# Patient Record
Sex: MALE | Race: WHITE | Employment: OTHER | URBAN - METROPOLITAN AREA
[De-identification: names, ages, dates, MRNs, and addresses within clinical notes are randomized per-mention and may not be internally consistent; named-entity substitution may affect disease eponyms.]

---

## 2024-03-20 ENCOUNTER — HOSPITAL ENCOUNTER (INPATIENT)
Age: 74
LOS: 9 days | Discharge: OTHER INSTITUTION | End: 2024-03-29
Attending: EMERGENCY MEDICINE | Admitting: PSYCHIATRY & NEUROLOGY
Payer: MEDICARE

## 2024-03-20 DIAGNOSIS — F41.1 GAD (GENERALIZED ANXIETY DISORDER): Primary | ICD-10-CM

## 2024-03-20 DIAGNOSIS — F01.C11 SEVERE VASCULAR DEMENTIA WITH AGITATION (HCC): ICD-10-CM

## 2024-03-20 PROBLEM — F03.90 DEMENTIA, UNSPECIFIED DEMENTIA SEVERITY, UNSPECIFIED DEMENTIA TYPE, UNSPECIFIED WHETHER BEHAVIORAL, PSYCHOTIC, OR MOOD DISTURBANCE OR ANXIETY (HCC): Status: ACTIVE | Noted: 2024-03-20

## 2024-03-20 PROBLEM — F03.918 DEMENTIA WITH BEHAVIORAL DISTURBANCE (HCC): Status: ACTIVE | Noted: 2024-03-20

## 2024-03-20 LAB
ALBUMIN SERPL-MCNC: 4.1 G/DL (ref 3.4–5)
ALBUMIN/GLOB SERPL: 1.7 {RATIO} (ref 1.1–2.2)
ALP SERPL-CCNC: 114 U/L (ref 40–129)
ALT SERPL-CCNC: 11 U/L (ref 10–40)
AMPHETAMINES UR QL SCN>1000 NG/ML: NORMAL
ANION GAP SERPL CALCULATED.3IONS-SCNC: 11 MMOL/L (ref 3–16)
AST SERPL-CCNC: 18 U/L (ref 15–37)
BARBITURATES UR QL SCN>200 NG/ML: NORMAL
BASOPHILS # BLD: 0.1 K/UL (ref 0–0.2)
BASOPHILS NFR BLD: 0.9 %
BENZODIAZ UR QL SCN>200 NG/ML: NORMAL
BILIRUB SERPL-MCNC: 0.4 MG/DL (ref 0–1)
BILIRUB UR QL STRIP.AUTO: NEGATIVE
BUN SERPL-MCNC: 16 MG/DL (ref 7–20)
CALCIUM SERPL-MCNC: 9.1 MG/DL (ref 8.3–10.6)
CANNABINOIDS UR QL SCN>50 NG/ML: NORMAL
CHLORIDE SERPL-SCNC: 100 MMOL/L (ref 99–110)
CLARITY UR: CLEAR
CO2 SERPL-SCNC: 26 MMOL/L (ref 21–32)
COCAINE UR QL SCN: NORMAL
COLOR UR: ABNORMAL
CREAT SERPL-MCNC: 1.1 MG/DL (ref 0.8–1.3)
DEPRECATED RDW RBC AUTO: 15.5 % (ref 12.4–15.4)
DRUG SCREEN COMMENT UR-IMP: NORMAL
EOSINOPHIL # BLD: 0.3 K/UL (ref 0–0.6)
EOSINOPHIL NFR BLD: 3.8 %
FENTANYL SCREEN, URINE: NORMAL
FLUAV RNA RESP QL NAA+PROBE: NOT DETECTED
FLUBV RNA RESP QL NAA+PROBE: NOT DETECTED
GFR SERPLBLD CREATININE-BSD FMLA CKD-EPI: >60 ML/MIN/{1.73_M2}
GLUCOSE BLD-MCNC: 146 MG/DL (ref 70–99)
GLUCOSE BLD-MCNC: 218 MG/DL (ref 70–99)
GLUCOSE SERPL-MCNC: 271 MG/DL (ref 70–99)
GLUCOSE UR STRIP.AUTO-MCNC: 500 MG/DL
HCT VFR BLD AUTO: 40.8 % (ref 40.5–52.5)
HGB BLD-MCNC: 13.5 G/DL (ref 13.5–17.5)
HGB UR QL STRIP.AUTO: NEGATIVE
KETONES UR STRIP.AUTO-MCNC: NEGATIVE MG/DL
LEUKOCYTE ESTERASE UR QL STRIP.AUTO: NEGATIVE
LYMPHOCYTES # BLD: 1.5 K/UL (ref 1–5.1)
LYMPHOCYTES NFR BLD: 18.2 %
MCH RBC QN AUTO: 31.7 PG (ref 26–34)
MCHC RBC AUTO-ENTMCNC: 33.1 G/DL (ref 31–36)
MCV RBC AUTO: 95.9 FL (ref 80–100)
METHADONE UR QL SCN>300 NG/ML: NORMAL
MONOCYTES # BLD: 0.7 K/UL (ref 0–1.3)
MONOCYTES NFR BLD: 8.9 %
NEUTROPHILS # BLD: 5.4 K/UL (ref 1.7–7.7)
NEUTROPHILS NFR BLD: 68.2 %
NITRITE UR QL STRIP.AUTO: NEGATIVE
OPIATES UR QL SCN>300 NG/ML: NORMAL
OXYCODONE UR QL SCN: NORMAL
PCP UR QL SCN>25 NG/ML: NORMAL
PERFORMED ON: ABNORMAL
PERFORMED ON: ABNORMAL
PH UR STRIP.AUTO: 6 [PH] (ref 5–8)
PH UR STRIP: 6 [PH]
PLATELET # BLD AUTO: 151 K/UL (ref 135–450)
PMV BLD AUTO: 8.4 FL (ref 5–10.5)
POTASSIUM SERPL-SCNC: 3.7 MMOL/L (ref 3.5–5.1)
PROT SERPL-MCNC: 6.5 G/DL (ref 6.4–8.2)
PROT UR STRIP.AUTO-MCNC: NEGATIVE MG/DL
RBC # BLD AUTO: 4.25 M/UL (ref 4.2–5.9)
SARS-COV-2 RNA RESP QL NAA+PROBE: NOT DETECTED
SODIUM SERPL-SCNC: 137 MMOL/L (ref 136–145)
SP GR UR STRIP.AUTO: 1.01 (ref 1–1.03)
TSH SERPL DL<=0.005 MIU/L-ACNC: 6.62 UIU/ML (ref 0.27–4.2)
UA COMPLETE W REFLEX CULTURE PNL UR: ABNORMAL
UA DIPSTICK W REFLEX MICRO PNL UR: ABNORMAL
URN SPEC COLLECT METH UR: ABNORMAL
UROBILINOGEN UR STRIP-ACNC: 0.2 E.U./DL
WBC # BLD AUTO: 8 K/UL (ref 4–11)

## 2024-03-20 PROCEDURE — 1240000000 HC EMOTIONAL WELLNESS R&B

## 2024-03-20 PROCEDURE — 84439 ASSAY OF FREE THYROXINE: CPT

## 2024-03-20 PROCEDURE — 80307 DRUG TEST PRSMV CHEM ANLYZR: CPT

## 2024-03-20 PROCEDURE — 81003 URINALYSIS AUTO W/O SCOPE: CPT

## 2024-03-20 PROCEDURE — 83036 HEMOGLOBIN GLYCOSYLATED A1C: CPT

## 2024-03-20 PROCEDURE — 6370000000 HC RX 637 (ALT 250 FOR IP): Performed by: PSYCHIATRY & NEUROLOGY

## 2024-03-20 PROCEDURE — 99285 EMERGENCY DEPT VISIT HI MDM: CPT

## 2024-03-20 PROCEDURE — 84443 ASSAY THYROID STIM HORMONE: CPT

## 2024-03-20 PROCEDURE — 36415 COLL VENOUS BLD VENIPUNCTURE: CPT

## 2024-03-20 PROCEDURE — 85025 COMPLETE CBC W/AUTO DIFF WBC: CPT

## 2024-03-20 PROCEDURE — 80053 COMPREHEN METABOLIC PANEL: CPT

## 2024-03-20 PROCEDURE — 80061 LIPID PANEL: CPT

## 2024-03-20 PROCEDURE — 87636 SARSCOV2 & INF A&B AMP PRB: CPT

## 2024-03-20 RX ORDER — QUETIAPINE FUMARATE 25 MG/1
25 TABLET, FILM COATED ORAL NIGHTLY
Status: DISCONTINUED | OUTPATIENT
Start: 2024-03-20 | End: 2024-03-26

## 2024-03-20 RX ORDER — ACETAMINOPHEN 325 MG/1
650 TABLET ORAL EVERY 4 HOURS PRN
Status: DISCONTINUED | OUTPATIENT
Start: 2024-03-20 | End: 2024-03-29 | Stop reason: HOSPADM

## 2024-03-20 RX ORDER — GLUCAGON 1 MG/ML
1 KIT INJECTION PRN
Status: DISCONTINUED | OUTPATIENT
Start: 2024-03-20 | End: 2024-03-22

## 2024-03-20 RX ORDER — QUETIAPINE FUMARATE 25 MG/1
25 TABLET, FILM COATED ORAL NIGHTLY
Status: ON HOLD | COMMUNITY
End: 2024-03-28

## 2024-03-20 RX ORDER — DEXTROSE MONOHYDRATE 100 MG/ML
INJECTION, SOLUTION INTRAVENOUS CONTINUOUS PRN
Status: DISCONTINUED | OUTPATIENT
Start: 2024-03-20 | End: 2024-03-29 | Stop reason: HOSPADM

## 2024-03-20 RX ORDER — LACTOBACILLUS RHAMNOSUS GG 10B CELL
1 CAPSULE ORAL 2 TIMES DAILY
Status: DISCONTINUED | OUTPATIENT
Start: 2024-03-21 | End: 2024-03-29 | Stop reason: HOSPADM

## 2024-03-20 RX ORDER — IBUPROFEN 400 MG/1
400 TABLET ORAL EVERY 6 HOURS PRN
Status: DISCONTINUED | OUTPATIENT
Start: 2024-03-20 | End: 2024-03-29 | Stop reason: HOSPADM

## 2024-03-20 RX ORDER — ALPRAZOLAM 0.25 MG/1
0.25 TABLET ORAL 3 TIMES DAILY PRN
Status: DISCONTINUED | OUTPATIENT
Start: 2024-03-20 | End: 2024-03-29 | Stop reason: HOSPADM

## 2024-03-20 RX ORDER — MECOBALAMIN 5000 MCG
5 TABLET,DISINTEGRATING ORAL NIGHTLY
Status: DISCONTINUED | OUTPATIENT
Start: 2024-03-20 | End: 2024-03-29 | Stop reason: HOSPADM

## 2024-03-20 RX ORDER — FAMOTIDINE 20 MG/1
20 TABLET, FILM COATED ORAL DAILY
Status: ON HOLD | COMMUNITY
End: 2024-03-28

## 2024-03-20 RX ORDER — INSULIN LISPRO 100 [IU]/ML
5 INJECTION, SOLUTION INTRAVENOUS; SUBCUTANEOUS
Status: DISCONTINUED | OUTPATIENT
Start: 2024-03-20 | End: 2024-03-27

## 2024-03-20 RX ORDER — FAMOTIDINE 20 MG/1
20 TABLET, FILM COATED ORAL DAILY
Status: DISCONTINUED | OUTPATIENT
Start: 2024-03-20 | End: 2024-03-29 | Stop reason: HOSPADM

## 2024-03-20 RX ORDER — NICOTINE 21 MG/24HR
1 PATCH, TRANSDERMAL 24 HOURS TRANSDERMAL DAILY
Status: DISCONTINUED | OUTPATIENT
Start: 2024-03-20 | End: 2024-03-29 | Stop reason: HOSPADM

## 2024-03-20 RX ORDER — ALPRAZOLAM 0.25 MG/1
0.25 TABLET ORAL 3 TIMES DAILY
Status: ON HOLD | COMMUNITY
End: 2024-03-28

## 2024-03-20 RX ORDER — MAGNESIUM HYDROXIDE/ALUMINUM HYDROXICE/SIMETHICONE 120; 1200; 1200 MG/30ML; MG/30ML; MG/30ML
30 SUSPENSION ORAL EVERY 6 HOURS PRN
Status: DISCONTINUED | OUTPATIENT
Start: 2024-03-20 | End: 2024-03-29 | Stop reason: HOSPADM

## 2024-03-20 RX ORDER — ACETAMINOPHEN 325 MG/1
650 TABLET ORAL EVERY 6 HOURS PRN
Status: ON HOLD | COMMUNITY
End: 2024-03-28

## 2024-03-20 RX ORDER — OLANZAPINE 5 MG/1
5 TABLET ORAL EVERY 4 HOURS PRN
Status: DISCONTINUED | OUTPATIENT
Start: 2024-03-20 | End: 2024-03-29 | Stop reason: HOSPADM

## 2024-03-20 RX ORDER — POLYETHYLENE GLYCOL 3350 17 G
2 POWDER IN PACKET (EA) ORAL
Status: DISCONTINUED | OUTPATIENT
Start: 2024-03-20 | End: 2024-03-29 | Stop reason: HOSPADM

## 2024-03-20 RX ORDER — BENZTROPINE MESYLATE 1 MG/ML
2 INJECTION INTRAMUSCULAR; INTRAVENOUS 2 TIMES DAILY PRN
Status: DISCONTINUED | OUTPATIENT
Start: 2024-03-20 | End: 2024-03-29 | Stop reason: HOSPADM

## 2024-03-20 RX ORDER — HYDROXYZINE HYDROCHLORIDE 25 MG/1
25 TABLET, FILM COATED ORAL EVERY 6 HOURS PRN
Status: DISCONTINUED | OUTPATIENT
Start: 2024-03-20 | End: 2024-03-29 | Stop reason: HOSPADM

## 2024-03-20 RX ADMIN — IBUPROFEN 400 MG: 400 TABLET, FILM COATED ORAL at 18:26

## 2024-03-20 RX ADMIN — FAMOTIDINE 20 MG: 20 TABLET ORAL at 20:42

## 2024-03-20 RX ADMIN — METFORMIN HYDROCHLORIDE 500 MG: 500 TABLET ORAL at 20:42

## 2024-03-20 RX ADMIN — QUETIAPINE FUMARATE 25 MG: 25 TABLET ORAL at 20:41

## 2024-03-20 RX ADMIN — Medication 5 MG: at 20:42

## 2024-03-20 ASSESSMENT — PAIN DESCRIPTION - ORIENTATION: ORIENTATION: RIGHT

## 2024-03-20 ASSESSMENT — LIFESTYLE VARIABLES
HOW OFTEN DO YOU HAVE A DRINK CONTAINING ALCOHOL: NEVER
HOW MANY STANDARD DRINKS CONTAINING ALCOHOL DO YOU HAVE ON A TYPICAL DAY: PATIENT DOES NOT DRINK

## 2024-03-20 ASSESSMENT — SLEEP AND FATIGUE QUESTIONNAIRES
DO YOU HAVE DIFFICULTY SLEEPING: COMMENT
DO YOU USE A SLEEP AID: COMMENT

## 2024-03-20 ASSESSMENT — PAIN DESCRIPTION - DESCRIPTORS: DESCRIPTORS: ACHING

## 2024-03-20 ASSESSMENT — PAIN DESCRIPTION - LOCATION: LOCATION: SHOULDER

## 2024-03-20 ASSESSMENT — PAIN SCALES - GENERAL: PAINLEVEL_OUTOF10: 0

## 2024-03-20 ASSESSMENT — PAIN - FUNCTIONAL ASSESSMENT: PAIN_FUNCTIONAL_ASSESSMENT: NONE - DENIES PAIN
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
## 2024-03-20 NOTE — PLAN OF CARE
. [facility]  Admission Note     Admission Type: Involuntary       Reason for admission: Major Neurocognitive disorder with behaviors. Patient was brought to the ED via ambulance from nursing facility where it was reported he was physically aggressive and combative. He was put on a statement of belief in the ED.         Addictive Behavior: unknown       Medical Problems:   No past medical history on file.    Status EXAM:       Tobacco Screening:  Practical Counseling, on admission, karen X, if applicable and completed (first 3 are required if patient doesn't refuse) n/a:            ( ) Recognizing danger situations (included triggers and roadblocks)                    ( ) Coping skills (new ways to manage stress,relaxation techniques, changing routine, distraction)                                                           ( ) Basic information about quitting (benefits of quitting, techniques in how to quit, available resources  ( ) [facility]                                                                                                                   ( ) Patient refused counseling  ( x) Patient has not smoked in the last 30 days    Metabolic Screening:    No results found for: \"LABA1C\"    No results found for: \"CHOL\"  No results found for: \"TRIG\"  No results found for: \"HDL\"  No components found for: \"LDLCAL\"  No components found for: \"LABVLDL\"      There is no height or weight on file to calculate BMI.    BP Readings from Last 2 Encounters:   03/20/24 132/74       Recliner Assessment:  Patient is not able to demonstrate the ability to move from a reclining position to an upright position within the recliner due to cognitive disability.    Pt admitted with followings belongings:     The following personal items were collected during admission. Items secured in locker/safe. Items will be returned to patient at discharge.     Tony Amado RN

## 2024-03-20 NOTE — ED NOTES
Tramaine from [facility] came to ER and gave papers to [facility], Tramaine stated pt is evicted from [facility]

## 2024-03-20 NOTE — ED NOTES
Pt remains resting with unlabored, even respirations.   Pt denies any needs, does not endorse hunger or thirst at this time.   Pt remains on bed alarm with call light within reach.

## 2024-03-20 NOTE — BH NOTE
4 Eyes Skin Assessment     The patient is being assessed for  Admission    I agree that 2 RN's have performed a thorough Head to Toe Skin Assessment on the patient. ALL assessment sites listed below have been assessed.       Areas assessed for pressure by both nurses: pimple like areas to chest and back. L wrists with scabs. Healing scabs to left wrist  [x]   Head, Face, and Ears   [x]   Shoulders, Back, and Chest  [x]   Arms, Elbows, and Hands   [x]   Coccyx, Sacrum, and Ischum  [x]   Legs, Feet, and Heels                                Skin Assessed Under all Medical Devices by both nurses:  N/a               All Mepilex Borders were peeled back and area peeked at by both nurses:  No: n/a  Please list where Mepilex Borders are located:  n/a                 Does the Patient have Skin Breakdown related to pressure?  No     (Insert Photo heren/a)         Dino Prevention initiated:  No   Wound Care Orders initiated:  No      WOC nurse consulted for Pressure Injury (Stage 3,4, Unstageable, DTI, NWPT, Complex wounds)and New or Established Ostomies:  No        Nurse 1 eSignature: Electronically signed by Aida Omer RN on 3/20/24 at 6:34 PM EDT    **SHARE this note so that the co-signing nurse is able to place an eSignature**    Nurse 2 eSignature: Electronically signed by Tony Amado RN on 3/20/24 at 6:45 PM EDT
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
## 2024-03-20 NOTE — PLAN OF CARE
Home Medication Reconciliation Status          [x] COMPLETE       Medication history has been reviewed and obtained from the following source(s):       [] patient/family verbal report             [] patient/family provided written list       [] external pharmacy   [x] external facility list         [x]  Provider notified that home medication reconciliation is complete          [] IN PROGRESS       Medication reconciliation marked in progress at this time due to:       [] patient/family poor historian      [] waiting arrival of family to clarify       [] waiting for accurate list        [] external pharmacy needs called      * Follow up is needed.          [] UNABLE TO ASSESS       Medication reconciliation is incomplete and unable to assess at this time due to:       [] critical patient condition   [] patient is unresponsive        [] no family available                       [] unknown pharmacy       [] anonymous patient          * Follow up is needed.      [] Pharmacy consult placed for medication reconciliation assistance   Additional comments:
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
## 2024-03-21 PROBLEM — Z79.4 TYPE 2 DIABETES MELLITUS WITHOUT COMPLICATION, WITH LONG-TERM CURRENT USE OF INSULIN (HCC): Status: ACTIVE | Noted: 2024-03-21

## 2024-03-21 PROBLEM — E11.9 TYPE 2 DIABETES MELLITUS WITHOUT COMPLICATION, WITH LONG-TERM CURRENT USE OF INSULIN (HCC): Status: ACTIVE | Noted: 2024-03-21

## 2024-03-21 PROBLEM — I38 ENDOCARDITIS: Status: ACTIVE | Noted: 2024-03-21

## 2024-03-21 PROBLEM — I34.0 MITRAL VALVE INSUFFICIENCY: Status: ACTIVE | Noted: 2024-03-21

## 2024-03-21 PROBLEM — F01.C11 SEVERE VASCULAR DEMENTIA WITH AGITATION (HCC): Status: ACTIVE | Noted: 2024-03-21

## 2024-03-21 LAB
EKG ATRIAL RATE: 62 BPM
EKG DIAGNOSIS: NORMAL
EKG P AXIS: 68 DEGREES
EKG P-R INTERVAL: 162 MS
EKG Q-T INTERVAL: 414 MS
EKG QRS DURATION: 70 MS
EKG QTC CALCULATION (BAZETT): 420 MS
EKG R AXIS: 7 DEGREES
EKG T AXIS: 23 DEGREES
EKG VENTRICULAR RATE: 62 BPM
GLUCOSE BLD-MCNC: 181 MG/DL (ref 70–99)
GLUCOSE BLD-MCNC: 199 MG/DL (ref 70–99)
PERFORMED ON: ABNORMAL
PERFORMED ON: ABNORMAL

## 2024-03-21 PROCEDURE — 99222 1ST HOSP IP/OBS MODERATE 55: CPT

## 2024-03-21 PROCEDURE — 6370000000 HC RX 637 (ALT 250 FOR IP): Performed by: PSYCHIATRY & NEUROLOGY

## 2024-03-21 PROCEDURE — 93010 ELECTROCARDIOGRAM REPORT: CPT | Performed by: INTERNAL MEDICINE

## 2024-03-21 PROCEDURE — 93005 ELECTROCARDIOGRAM TRACING: CPT | Performed by: PSYCHIATRY & NEUROLOGY

## 2024-03-21 PROCEDURE — 99223 1ST HOSP IP/OBS HIGH 75: CPT | Performed by: PSYCHIATRY & NEUROLOGY

## 2024-03-21 PROCEDURE — 1240000000 HC EMOTIONAL WELLNESS R&B

## 2024-03-21 RX ADMIN — METFORMIN HYDROCHLORIDE 500 MG: 500 TABLET ORAL at 17:25

## 2024-03-21 RX ADMIN — FAMOTIDINE 20 MG: 20 TABLET ORAL at 08:02

## 2024-03-21 RX ADMIN — ALPRAZOLAM 0.25 MG: 0.25 TABLET ORAL at 08:57

## 2024-03-21 RX ADMIN — Medication 5 MG: at 20:04

## 2024-03-21 RX ADMIN — IBUPROFEN 400 MG: 400 TABLET, FILM COATED ORAL at 18:21

## 2024-03-21 RX ADMIN — INSULIN LISPRO 5 UNITS: 100 INJECTION, SOLUTION INTRAVENOUS; SUBCUTANEOUS at 17:11

## 2024-03-21 RX ADMIN — ALPRAZOLAM 0.25 MG: 0.25 TABLET ORAL at 20:04

## 2024-03-21 RX ADMIN — QUETIAPINE FUMARATE 25 MG: 25 TABLET ORAL at 20:04

## 2024-03-21 RX ADMIN — METFORMIN HYDROCHLORIDE 500 MG: 500 TABLET ORAL at 08:02

## 2024-03-21 RX ADMIN — INSULIN LISPRO 5 UNITS: 100 INJECTION, SOLUTION INTRAVENOUS; SUBCUTANEOUS at 08:47

## 2024-03-21 RX ADMIN — Medication 1 CAPSULE: at 08:02

## 2024-03-21 RX ADMIN — IBUPROFEN 400 MG: 400 TABLET, FILM COATED ORAL at 13:00

## 2024-03-21 ASSESSMENT — PAIN DESCRIPTION - ORIENTATION
ORIENTATION: RIGHT
ORIENTATION: RIGHT

## 2024-03-21 ASSESSMENT — SLEEP AND FATIGUE QUESTIONNAIRES
DO YOU HAVE DIFFICULTY SLEEPING: YES
SLEEP PATTERN: UNABLE TO ASSESS

## 2024-03-21 ASSESSMENT — PAIN DESCRIPTION - DESCRIPTORS
DESCRIPTORS: ACHING
DESCRIPTORS: ACHING

## 2024-03-21 ASSESSMENT — PAIN - FUNCTIONAL ASSESSMENT
PAIN_FUNCTIONAL_ASSESSMENT: ACTIVITIES ARE NOT PREVENTED
PAIN_FUNCTIONAL_ASSESSMENT: ACTIVITIES ARE NOT PREVENTED

## 2024-03-21 ASSESSMENT — PAIN DESCRIPTION - LOCATION
LOCATION: SHOULDER
LOCATION: SHOULDER

## 2024-03-21 ASSESSMENT — PAIN SCALES - GENERAL: PAINLEVEL_OUTOF10: 6

## 2024-03-21 NOTE — PROGRESS NOTES
Pt A+O to self only, isolative to his room, cooperative and medication compliant.  He denies SI/HI/AVH and no RTIS noted.  He is currently resting in bed and denies any needs at this time.

## 2024-03-21 NOTE — PLAN OF CARE
Pt. Is quiet and withdrawn to his bedroom. He is confused, alert to self only, friendly upon approach, good appetite, and received medications well tolerated. He c/o right shoulder pain, offered pain intervention including medication patient declined assistance. Pt has been cooperative no aggressive behaviors noted. No SI HI AVH noted.

## 2024-03-21 NOTE — PLAN OF CARE
Call placed to [facility] [phone] to inquire about infusion therapy for [place]. No answer after several attempts.
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
## 2024-03-21 NOTE — PLAN OF CARE
Pt has had no exit seeking behavior or elopement attempts.  He remains withdrawn to his room.    Problem: Risk for Elopement  Goal: Patient will not exit the unit/facility without proper excort  Outcome: Progressing  Flowsheets  Taken 3/20/2024 1839 by Aida Omer, RN  Nursing Interventions for Elopement Risk: Assist with personal care needs such as toileting, eating, dressing, as needed to reduce the risk of wandering  Taken 3/20/2024 1807 by Aida Omer, RN  Nursing Interventions for Elopement Risk: Assist with personal care needs such as toileting, eating, dressing, as needed to reduce the risk of wandering
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
## 2024-03-21 NOTE — PLAN OF CARE
Advised patient to avoid certain medications such as antihistamine/decongestant drops, cold medications and to read labels on over the counter and prescription medications in which it may cause an acute angle-closure glaucoma attack until procedures are done in both eyes. Pt. C/o right shoulder pain administered ibuprofen 400 mg po at 1822

## 2024-03-21 NOTE — PLAN OF CARE
[facility]  Initial Interdisciplinary Treatment Plan NOTE    Review Date & Time: 3/21/24 0915    Patient was not in treatment team    Admission Type:   Admission Type: Involuntary    Reason for admission:  Reason for Admission: Patient is confused and became combative with staff      Estimated Length of Stay Update:  3-5 days  Estimated Discharge Date Update: 3/24/24-3/26/24    EDUCATION:   Learner Progress Toward Treatment Goals: Reviewed results and recommendations of this team    Method: Individual    Outcome: Verbalized understanding    PATIENT GOALS: none expressed    PLAN/TREATMENT RECOMMENDATIONS UPDATE: evaluate and treat    GOALS UPDATE:   Time frame for Short-Term Goals: 2 days    Tony Amado RN

## 2024-03-21 NOTE — PLAN OF CARE
Pt is anxious, pacing in his room, states he's jittery and nervious. Administered Xanax 0.25 mg at 0858

## 2024-03-21 NOTE — PLAN OF CARE
Pt c/o right shoulder pain. Administered Ibuprofen 400 mg PO at 1300. Will monitor for effectiveness.
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
## 2024-03-21 NOTE — PROGRESS NOTES
Behavioral Services  Medicare Certification Upon Admission    I certify that this patient's inpatient psychiatric hospital admission is medically necessary for:    [x] (1) Treatment which could reasonably be expected to improve this patient's condition,       [x] (2) Or for diagnostic study;     AND     [x](2) The inpatient psychiatric services are provided while the individual is under the care of a physician and are included in the individualized plan of care.    Estimated length of stay/service 7 d    Plan for post-hospital care outpt    Electronically signed by GABINO TREVIÑO MD on 3/21/2024 at 11:49 AM

## 2024-03-22 ENCOUNTER — APPOINTMENT (OUTPATIENT)
Dept: GENERAL RADIOLOGY | Age: 74
End: 2024-03-22
Payer: MEDICARE

## 2024-03-22 LAB
CHOLEST SERPL-MCNC: 228 MG/DL (ref 0–199)
EST. AVERAGE GLUCOSE BLD GHB EST-MCNC: 191.5 MG/DL
GLUCOSE BLD-MCNC: 135 MG/DL (ref 70–99)
GLUCOSE BLD-MCNC: 141 MG/DL (ref 70–99)
GLUCOSE BLD-MCNC: 161 MG/DL (ref 70–99)
GLUCOSE BLD-MCNC: 191 MG/DL (ref 70–99)
HBA1C MFR BLD: 8.3 %
HDLC SERPL-MCNC: 59 MG/DL (ref 40–60)
LDLC SERPL CALC-MCNC: 132 MG/DL
PERFORMED ON: ABNORMAL
T4 FREE SERPL-MCNC: 1.1 NG/DL (ref 0.9–1.8)
TRIGL SERPL-MCNC: 184 MG/DL (ref 0–150)
VLDLC SERPL CALC-MCNC: 37 MG/DL

## 2024-03-22 PROCEDURE — 1240000000 HC EMOTIONAL WELLNESS R&B

## 2024-03-22 PROCEDURE — 73030 X-RAY EXAM OF SHOULDER: CPT

## 2024-03-22 PROCEDURE — 6370000000 HC RX 637 (ALT 250 FOR IP): Performed by: PSYCHIATRY & NEUROLOGY

## 2024-03-22 PROCEDURE — 99233 SBSQ HOSP IP/OBS HIGH 50: CPT | Performed by: PSYCHIATRY & NEUROLOGY

## 2024-03-22 PROCEDURE — 73060 X-RAY EXAM OF HUMERUS: CPT

## 2024-03-22 RX ORDER — MEMANTINE HYDROCHLORIDE 5 MG/1
5 TABLET ORAL DAILY
Status: DISCONTINUED | OUTPATIENT
Start: 2024-03-22 | End: 2024-03-25

## 2024-03-22 RX ORDER — PERPHENAZINE 4 MG/1
4 TABLET ORAL NIGHTLY
Status: DISCONTINUED | OUTPATIENT
Start: 2024-03-22 | End: 2024-03-29 | Stop reason: HOSPADM

## 2024-03-22 RX ADMIN — PERPHENAZINE 4 MG: 4 TABLET, FILM COATED ORAL at 20:50

## 2024-03-22 RX ADMIN — Medication 1 CAPSULE: at 10:05

## 2024-03-22 RX ADMIN — QUETIAPINE FUMARATE 25 MG: 25 TABLET ORAL at 20:50

## 2024-03-22 RX ADMIN — FAMOTIDINE 20 MG: 20 TABLET ORAL at 10:05

## 2024-03-22 RX ADMIN — MEMANTINE HYDROCHLORIDE 5 MG: 5 TABLET ORAL at 14:23

## 2024-03-22 RX ADMIN — Medication 5 MG: at 20:50

## 2024-03-22 RX ADMIN — METFORMIN HYDROCHLORIDE 500 MG: 500 TABLET ORAL at 16:56

## 2024-03-22 RX ADMIN — INSULIN LISPRO 5 UNITS: 100 INJECTION, SOLUTION INTRAVENOUS; SUBCUTANEOUS at 11:50

## 2024-03-22 RX ADMIN — Medication 1 CAPSULE: at 13:58

## 2024-03-22 RX ADMIN — IBUPROFEN 400 MG: 400 TABLET, FILM COATED ORAL at 14:05

## 2024-03-22 RX ADMIN — ACETAMINOPHEN 650 MG: 325 TABLET ORAL at 14:05

## 2024-03-22 RX ADMIN — METFORMIN HYDROCHLORIDE 500 MG: 500 TABLET ORAL at 10:05

## 2024-03-22 ASSESSMENT — PAIN SCALES - GENERAL
PAINLEVEL_OUTOF10: 0
PAINLEVEL_OUTOF10: 8

## 2024-03-22 ASSESSMENT — PAIN DESCRIPTION - LOCATION: LOCATION: SHOULDER

## 2024-03-22 NOTE — PLAN OF CARE
Patient is alert and oriented x 3. Takes medications whole with water and without issue. Patient is independent and continent. Calm, friendly and cooperative with staff. Is visible on unit after meals.  Interacting with peers appropriately. Denies SI/HI. +RTIS noted.  No signs or symptoms of distress.       Problem: Risk for Elopement  Goal: Patient will not exit the unit/facility without proper excort  3/22/2024 1041 by Mikala Araujo RN  Outcome: Progressing  3/21/2024 2131 by Terrie Sanchez RN  Outcome: Progressing     Problem: Depression  Goal: Will be euthymic at discharge  Description: INTERVENTIONS:  1. Administer medication as ordered  2. Provide emotional support via 1:1 interaction with staff  3. Encourage involvement in milieu/groups/activities  4. Monitor for social isolation  3/22/2024 1041 by Mikala Araujo RN  Outcome: Progressing  3/21/2024 2131 by Terrie Sanchez RN  Outcome: Progressing     Problem: Behavior  Goal: Pt/Family maintain appropriate behavior and adhere to behavioral management agreement, if implemented  Description: INTERVENTIONS:  1. Assess patient/family's coping skills and  non-compliant behavior (including use of illegal substances)  2. Notify security of behavior or suspected illegal substances which indicate the need for search of the family and/or belongings  3. Encourage verbalization of thoughts and concerns in a socially appropriate manner  4. Utilize positive, consistent limit setting strategies supporting safety of patient, staff and others  5. Encourage participation in the decision making process about the behavioral management agreement  6. If a visitor's behavior poses a threat to safety call refer to organization policy.  7. Initiate consult with , Psychosocial CNS, Spiritual Care as appropriate  3/22/2024 1041 by Mikala Araujo RN  Outcome: Progressing  3/21/2024 2131 by Terrie Sanchez RN  Outcome: Not Progressing     Problem:  Progressing  3/21/2024 2131 by Terrie Sanchez, RN  Outcome: Not Progressing

## 2024-03-22 NOTE — PROGRESS NOTES
Department of Psychiatry  AttendingProgress Note  Chief Complaint: dementia  Adonis has  been complaining of pain in R upper arm. He is not able to give much detail regarding the pain and it is a constant complaint. Will order Xrays today .   He is disoriented and unable to provide any information regarding his hospitla stay.   Add Namenda 5 mg QD for dementia.  Add Perphenazine 4 mg hS for agitation .   Patient's chart was reviewed and collaborated with  about the treatment plan.  SUBJECTIVE:    Patient is feeling unchanged. Suicidal ideation:  TRAVON.  Patient does not have medication side effects.    ROS: Patient has new complaints: yes - pain  Sleeping adequately:     Appetite adequate:   Attending groups: No:   Visitors:No    OBJECTIVE    Physical  VITALS:  /89   Pulse 68   Temp 98.6 °F (37 °C) (Temporal)   Resp 16   Ht 1.676 m (5' 5.98\")   Wt 65.8 kg (145 lb)   SpO2 95%   BMI 23.41 kg/m²     Mental Status Examination:  Patients appearance was ill-appearing. Thoughts are Unusual fears. Homicidal ideations TRAVON.  No abnormal movements, tics or mannerisms.  Memory impaired Aims 0. Concentration Poor.   Alert and oriented X 4. Insight and Judgement impaired insight. Patient was uncooperative. Patient gait abnormal. Mood irritable and labile, affect labile affect Hallucinations TRAVON, suicidal ideations no specific plan to harm self Speech loud  Data  Labs:   Admission on 03/20/2024   Component Date Value Ref Range Status    WBC 03/20/2024 8.0  4.0 - 11.0 K/uL Final    RBC 03/20/2024 4.25  4.20 - 5.90 M/uL Final    Hemoglobin 03/20/2024 13.5  13.5 - 17.5 g/dL Final    Hematocrit 03/20/2024 40.8  40.5 - 52.5 % Final    MCV 03/20/2024 95.9  80.0 - 100.0 fL Final    MCH 03/20/2024 31.7  26.0 - 34.0 pg Final    MCHC 03/20/2024 33.1  31.0 - 36.0 g/dL Final    RDW 03/20/2024 15.5 (H)  12.4 - 15.4 % Final    Platelets 03/20/2024 151  135 - 450 K/uL Final    MPV 03/20/2024 8.4  5.0 - 10.5 fL Final     18  15 - 37 U/L Final    Color, UA 03/20/2024 Straw  Straw/Yellow Final    Clarity, UA 03/20/2024 Clear  Clear Final    Glucose, Ur 03/20/2024 500 (A)  Negative mg/dL Final    Bilirubin Urine 03/20/2024 Negative  Negative Final    Ketones, Urine 03/20/2024 Negative  Negative mg/dL Final    Specific Gravity, UA 03/20/2024 1.015  1.005 - 1.030 Final    Blood, Urine 03/20/2024 Negative  Negative Final    pH, UA 03/20/2024 6.0  5.0 - 8.0 Final    Protein, UA 03/20/2024 Negative  Negative mg/dL Final    Urobilinogen, Urine 03/20/2024 0.2  <2.0 E.U./dL Final    Nitrite, Urine 03/20/2024 Negative  Negative Final    Leukocyte Esterase, Urine 03/20/2024 Negative  Negative Final    Microscopic Examination 03/20/2024 Not Indicated   Final    Urine Type 03/20/2024 NotGiven   Final    Urine received in a container without preservatives.    Urine Reflex to Culture 03/20/2024 Not Indicated   Final    Amphetamine Screen, Urine 03/20/2024 Neg  Negative <1000ng/mL Final    Barbiturate Screen, Ur 03/20/2024 Neg  Negative <200 ng/mL Final    Benzodiazepine Screen, Urine 03/20/2024 Neg  Negative <200 ng/mL Final    Cannabinoid Scrn, Ur 03/20/2024 Neg  Negative <50 ng/mL Final    Cocaine Metabolite Screen, Urine 03/20/2024 Neg  Negative <300 ng/mL Final    Opiate Scrn, Ur 03/20/2024 Neg  Negative <300 ng/mL Final    Comment: \"Therapeutic levels of pain medication, especially oxycontin and synthetic  opioids, may not be detected by this Methodology. Pain management screen  panel  Drug panel-PM-Hi Res Ur, Interp (PAIN) should be considered for drug  monitoring \".      PCP Screen, Urine 03/20/2024 Neg  Negative <25 ng/mL Final    Methadone Screen, Urine 03/20/2024 Neg  Negative <300 ng/mL Final    Oxycodone Urine 03/20/2024 Neg  Negative <100 ng/ml Final    FENTANYL SCREEN, URINE 03/20/2024 Neg  Negative <5 ng/mL Final    pH, UA 03/20/2024 6.0   Final    Comment: Urine pH less than 5.0 or greater than 8.0 may indicate sample
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
## 2024-03-23 LAB
GLUCOSE BLD-MCNC: 120 MG/DL (ref 70–99)
GLUCOSE BLD-MCNC: 135 MG/DL (ref 70–99)
GLUCOSE BLD-MCNC: 136 MG/DL (ref 70–99)
GLUCOSE BLD-MCNC: 215 MG/DL (ref 70–99)
PERFORMED ON: ABNORMAL

## 2024-03-23 PROCEDURE — 6370000000 HC RX 637 (ALT 250 FOR IP): Performed by: PSYCHIATRY & NEUROLOGY

## 2024-03-23 PROCEDURE — 1240000000 HC EMOTIONAL WELLNESS R&B

## 2024-03-23 PROCEDURE — 99232 SBSQ HOSP IP/OBS MODERATE 35: CPT | Performed by: NURSE PRACTITIONER

## 2024-03-23 RX ADMIN — MEMANTINE HYDROCHLORIDE 5 MG: 5 TABLET ORAL at 09:58

## 2024-03-23 RX ADMIN — ACETAMINOPHEN 650 MG: 325 TABLET ORAL at 09:58

## 2024-03-23 RX ADMIN — Medication 5 MG: at 20:15

## 2024-03-23 RX ADMIN — PERPHENAZINE 4 MG: 4 TABLET, FILM COATED ORAL at 20:15

## 2024-03-23 RX ADMIN — ALPRAZOLAM 0.25 MG: 0.25 TABLET ORAL at 20:15

## 2024-03-23 RX ADMIN — QUETIAPINE FUMARATE 25 MG: 25 TABLET ORAL at 20:15

## 2024-03-23 RX ADMIN — Medication 1 CAPSULE: at 15:39

## 2024-03-23 RX ADMIN — ACETAMINOPHEN 650 MG: 325 TABLET ORAL at 20:15

## 2024-03-23 RX ADMIN — FAMOTIDINE 20 MG: 20 TABLET ORAL at 09:58

## 2024-03-23 RX ADMIN — Medication 1 CAPSULE: at 09:58

## 2024-03-23 RX ADMIN — INSULIN LISPRO 5 UNITS: 100 INJECTION, SOLUTION INTRAVENOUS; SUBCUTANEOUS at 18:12

## 2024-03-23 RX ADMIN — METFORMIN HYDROCHLORIDE 500 MG: 500 TABLET ORAL at 18:03

## 2024-03-23 RX ADMIN — METFORMIN HYDROCHLORIDE 500 MG: 500 TABLET ORAL at 09:58

## 2024-03-23 ASSESSMENT — PAIN DESCRIPTION - DESCRIPTORS: DESCRIPTORS: PATIENT UNABLE TO DESCRIBE

## 2024-03-23 ASSESSMENT — PAIN DESCRIPTION - LOCATION
LOCATION: SHOULDER

## 2024-03-23 ASSESSMENT — PAIN - FUNCTIONAL ASSESSMENT: PAIN_FUNCTIONAL_ASSESSMENT: ACTIVITIES ARE NOT PREVENTED

## 2024-03-23 ASSESSMENT — PAIN SCALES - GENERAL
PAINLEVEL_OUTOF10: 3
PAINLEVEL_OUTOF10: 0
PAINLEVEL_OUTOF10: 5
PAINLEVEL_OUTOF10: 5

## 2024-03-23 ASSESSMENT — PAIN SCALES - WONG BAKER: WONGBAKER_NUMERICALRESPONSE: NO HURT

## 2024-03-23 ASSESSMENT — PAIN DESCRIPTION - ORIENTATION: ORIENTATION: RIGHT

## 2024-03-23 NOTE — PROGRESS NOTES
Department of Psychiatry  AttendingProgress Note  Chief Complaint: dementia    Patient's chart was reviewed and collaborated with  about the treatment plan.    SUBJECTIVE:        Pt is resting in bed and does not respond to questions. Staff reports patient has been confused. He has complained of arm pain. An xray was negative. He sleeps a lot during the day and isolates in room.     Patient is feeling  unable to assess . Suicidal ideation:  unable to assess, none reported by staff.  Patient  does not report  medication side effects.    ROS: Patient has new complaints: no  Sleeping adequately:  Yes   Appetite adequate: Yes  Attending groups: No:   Visitors:No    OBJECTIVE    Physical  VITALS:  /89   Pulse 68   Temp 98.6 °F (37 °C) (Temporal)   Resp 16   Ht 1.676 m (5' 5.98\")   Wt 65.8 kg (145 lb)   SpO2 95%   BMI 23.41 kg/m²     Mental Status Examination:  Patients appearance was lying in bed. Thoughts are   unable to assess  . Homicidal ideations unable to assess.  No abnormal movements, tics or mannerisms.  Memory unable to assess Aims 0. Concentration Unable to Assess.   Alert and oriented X 4. Insight and Judgement impaired insight. Patient was unarousable. Patient gait unable to assess. Mood unable to assess, affect unable to assess Hallucinations Absent, suicidal ideations no specific plan to harm self Speech unable to assess    Data  Labs:   Admission on 03/20/2024   Component Date Value Ref Range Status    WBC 03/20/2024 8.0  4.0 - 11.0 K/uL Final    RBC 03/20/2024 4.25  4.20 - 5.90 M/uL Final    Hemoglobin 03/20/2024 13.5  13.5 - 17.5 g/dL Final    Hematocrit 03/20/2024 40.8  40.5 - 52.5 % Final    MCV 03/20/2024 95.9  80.0 - 100.0 fL Final    MCH 03/20/2024 31.7  26.0 - 34.0 pg Final    MCHC 03/20/2024 33.1  31.0 - 36.0 g/dL Final    RDW 03/20/2024 15.5 (H)  12.4 - 15.4 % Final    Platelets 03/20/2024 151  135 - 450 K/uL Final    MPV 03/20/2024 8.4  5.0 - 10.5 fL Final     03/23/2024 120 (H)  70 - 99 mg/dl Final    Performed on 03/23/2024 ACCU-CHEK   Final    POC Glucose 03/23/2024 215 (H)  70 - 99 mg/dl Final    Performed on 03/23/2024 ACCU-CHEK   Final    POC Glucose 03/24/2024 123 (H)  70 - 99 mg/dl Final    Performed on 03/24/2024 ACCU-CHEK   Final    POC Glucose 03/24/2024 138 (H)  70 - 99 mg/dl Final    Performed on 03/24/2024 ACCU-CHEK   Final    POC Glucose 03/24/2024 127 (H)  70 - 99 mg/dl Final    Performed on 03/24/2024 ACCU-CHEK   Final    POC Glucose 03/25/2024 145 (H)  70 - 99 mg/dl Final    Performed on 03/25/2024 ACCU-CHEK   Final    POC Glucose 03/25/2024 143 (H)  70 - 99 mg/dl Final    Performed on 03/25/2024 ACCU-CHEK   Final    POC Glucose 03/25/2024 120 (H)  70 - 99 mg/dl Final    Performed on 03/25/2024 ACCU-CHEK   Final    POC Glucose 03/25/2024 219 (H)  70 - 99 mg/dl Final    Performed on 03/25/2024 ACCU-CHEK   Final    POC Glucose 03/26/2024 140 (H)  70 - 99 mg/dl Final    Performed on 03/26/2024 ACCU-CHEK   Final            Medications  Current Facility-Administered Medications: memantine (NAMENDA) tablet 10 mg, 10 mg, Oral, Daily  perphenazine tablet 4 mg, 4 mg, Oral, Nightly  acetaminophen (TYLENOL) tablet 650 mg, 650 mg, Oral, Q4H PRN  ibuprofen (ADVIL;MOTRIN) tablet 400 mg, 400 mg, Oral, Q6H PRN  magnesium hydroxide (MILK OF MAGNESIA) 400 MG/5ML suspension 30 mL, 30 mL, Oral, Daily PRN  nicotine (NICODERM CQ) 21 MG/24HR 1 patch, 1 patch, TransDERmal, Daily  nicotine polacrilex (COMMIT) lozenge 2 mg, 2 mg, Oral, Q1H PRN  aluminum & magnesium hydroxide-simethicone (MAALOX) 200-200-20 MG/5ML suspension 30 mL, 30 mL, Oral, Q6H PRN  OLANZapine (ZYPREXA) tablet 5 mg, 5 mg, Oral, Q4H PRN **OR** OLANZapine (ZyPREXA) 5 mg in sterile water 1 mL injection, 5 mg, IntraMUSCular, Q4H PRN  benztropine mesylate (COGENTIN) injection 2 mg, 2 mg, IntraMUSCular, BID PRN  melatonin disintegrating tablet 5 mg, 5 mg, Oral, Nightly  hydrOXYzine HCl (ATARAX) tablet 25 mg, 25 mg,

## 2024-03-23 NOTE — PLAN OF CARE
[facility]  Day 3 Interdisciplinary Treatment Plan NOTE    Review Date & Time: 3/23/24 1210    Patient was not in treatment team    Estimated Length of Stay Update:  3-5 days  Estimated Discharge Date Update: 3/26/24-3/28/24    EDUCATION:   Learner Progress Toward Treatment Goals: Reviewed results and recommendations of this team    Method: Individual    Outcome: Verbalized understanding    PATIENT GOALS: none expressed    PLAN/TREATMENT RECOMMENDATIONS UPDATE:evaluate and treat    GOALS UPDATE:   Time frame for Short-Term Goals: 2 days      Tony Amado RN

## 2024-03-23 NOTE — PLAN OF CARE
Patient is alert and oriented x 1. Takes medications whole with water and without issue.  Patient is independent and continent. Calm, friendly and cooperative with staff. Isolated to room.  Interacting with peers appropriately. Denies AH/VH/SI/HI and no RTIS noted.  No signs or symptoms of distress.  Complaining of right arm pain.       Problem: Risk for Elopement  Goal: Patient will not exit the unit/facility without proper excort  3/23/2024 1302 by Mikala Araujo RN  Outcome: Progressing  3/23/2024 0318 by Terrie Sanchez RN  Outcome: Progressing     Problem: Depression  Goal: Will be euthymic at discharge  Description: INTERVENTIONS:  1. Administer medication as ordered  2. Provide emotional support via 1:1 interaction with staff  3. Encourage involvement in milieu/groups/activities  4. Monitor for social isolation  3/23/2024 1302 by Mikala Araujo RN  Outcome: Progressing  3/23/2024 0318 by Terrie Sanchez RN  Outcome: Progressing     Problem: Behavior  Goal: Pt/Family maintain appropriate behavior and adhere to behavioral management agreement, if implemented  Description: INTERVENTIONS:  1. Assess patient/family's coping skills and  non-compliant behavior (including use of illegal substances)  2. Notify security of behavior or suspected illegal substances which indicate the need for search of the family and/or belongings  3. Encourage verbalization of thoughts and concerns in a socially appropriate manner  4. Utilize positive, consistent limit setting strategies supporting safety of patient, staff and others  5. Encourage participation in the decision making process about the behavioral management agreement  6. If a visitor's behavior poses a threat to safety call refer to organization policy.  7. Initiate consult with , Psychosocial CNS, Spiritual Care as appropriate  3/23/2024 0318 by Terrie Sanchez RN  Outcome: Progressing     Problem: Anxiety  Goal: Will report anxiety at  manageable levels  Description: INTERVENTIONS:  1. Administer medication as ordered  2. Teach and rehearse alternative coping skills  3. Provide emotional support with 1:1 interaction with staff  3/23/2024 1302 by Mikala Araujo RN  Outcome: Progressing  3/23/2024 0318 by Terrie Sanchez RN  Outcome: Progressing     Problem: Chronic Conditions and Co-morbidities  Goal: Patient's chronic conditions and co-morbidity symptoms are monitored and maintained or improved  3/23/2024 1302 by Mikala Araujo RN  Outcome: Progressing  3/23/2024 0318 by Terrie Sanhcez RN  Outcome: Progressing     Problem: Pain  Goal: Verbalizes/displays adequate comfort level or baseline comfort level  Outcome: Progressing

## 2024-03-23 NOTE — PROGRESS NOTES

## 2024-03-23 NOTE — PLAN OF CARE
Problem: Risk for Elopement  Goal: Patient will not exit the unit/facility without proper excort  Outcome: Progressing     Problem: Depression  Goal: Will be euthymic at discharge  Description: INTERVENTIONS:  1. Administer medication as ordered  2. Provide emotional support via 1:1 interaction with staff  3. Encourage involvement in milieu/groups/activities  4. Monitor for social isolation  Outcome: Progressing     Problem: Behavior  Goal: Pt/Family maintain appropriate behavior and adhere to behavioral management agreement, if implemented  Description: INTERVENTIONS:  1. Assess patient/family's coping skills and  non-compliant behavior (including use of illegal substances)  2. Notify security of behavior or suspected illegal substances which indicate the need for search of the family and/or belongings  3. Encourage verbalization of thoughts and concerns in a socially appropriate manner  4. Utilize positive, consistent limit setting strategies supporting safety of patient, staff and others  5. Encourage participation in the decision making process about the behavioral management agreement  6. If a visitor's behavior poses a threat to safety call refer to organization policy.  7. Initiate consult with , Psychosocial CNS, Spiritual Care as appropriate  Outcome: Progressing     Problem: Anxiety  Goal: Will report anxiety at manageable levels  Description: INTERVENTIONS:  1. Administer medication as ordered  2. Teach and rehearse alternative coping skills  3. Provide emotional support with 1:1 interaction with staff  Outcome: Progressing     Problem: Chronic Conditions and Co-morbidities  Goal: Patient's chronic conditions and co-morbidity symptoms are monitored and maintained or improved  Outcome: Progressing     Patient withdrawn to room throughout evening and night. Awakened for vital signs and medications. Patient pleasantly confused and cooperative.  FSBS 141 this evening. Denies SH/VH, SI/HI. No

## 2024-03-23 NOTE — PROGRESS NOTES
Occupational Therapy  Attempted to see patient for MOCA. Patient refused to get out of bed and participate due to complaints of fatigue of right upper extremity pain. Will re-attempt as patient tolerates and schedule allows.     Royce Jansen, OTR/L #[id number]

## 2024-03-24 LAB
GLUCOSE BLD-MCNC: 123 MG/DL (ref 70–99)
GLUCOSE BLD-MCNC: 127 MG/DL (ref 70–99)
GLUCOSE BLD-MCNC: 138 MG/DL (ref 70–99)
PERFORMED ON: ABNORMAL

## 2024-03-24 PROCEDURE — 97530 THERAPEUTIC ACTIVITIES: CPT

## 2024-03-24 PROCEDURE — 6370000000 HC RX 637 (ALT 250 FOR IP): Performed by: PSYCHIATRY & NEUROLOGY

## 2024-03-24 PROCEDURE — 97166 OT EVAL MOD COMPLEX 45 MIN: CPT

## 2024-03-24 PROCEDURE — 1240000000 HC EMOTIONAL WELLNESS R&B

## 2024-03-24 RX ADMIN — ACETAMINOPHEN 650 MG: 325 TABLET ORAL at 20:56

## 2024-03-24 RX ADMIN — ALPRAZOLAM 0.25 MG: 0.25 TABLET ORAL at 20:56

## 2024-03-24 RX ADMIN — QUETIAPINE FUMARATE 25 MG: 25 TABLET ORAL at 20:56

## 2024-03-24 RX ADMIN — PERPHENAZINE 4 MG: 4 TABLET, FILM COATED ORAL at 20:56

## 2024-03-24 RX ADMIN — MEMANTINE HYDROCHLORIDE 5 MG: 5 TABLET ORAL at 09:20

## 2024-03-24 RX ADMIN — Medication 1 CAPSULE: at 13:26

## 2024-03-24 RX ADMIN — INSULIN LISPRO 5 UNITS: 100 INJECTION, SOLUTION INTRAVENOUS; SUBCUTANEOUS at 12:05

## 2024-03-24 RX ADMIN — IBUPROFEN 400 MG: 400 TABLET, FILM COATED ORAL at 16:57

## 2024-03-24 RX ADMIN — Medication 5 MG: at 20:56

## 2024-03-24 RX ADMIN — IBUPROFEN 400 MG: 400 TABLET, FILM COATED ORAL at 09:24

## 2024-03-24 RX ADMIN — INSULIN LISPRO 5 UNITS: 100 INJECTION, SOLUTION INTRAVENOUS; SUBCUTANEOUS at 09:20

## 2024-03-24 RX ADMIN — Medication 1 CAPSULE: at 09:20

## 2024-03-24 RX ADMIN — METFORMIN HYDROCHLORIDE 500 MG: 500 TABLET ORAL at 16:58

## 2024-03-24 RX ADMIN — FAMOTIDINE 20 MG: 20 TABLET ORAL at 09:20

## 2024-03-24 RX ADMIN — METFORMIN HYDROCHLORIDE 500 MG: 500 TABLET ORAL at 09:20

## 2024-03-24 RX ADMIN — ACETAMINOPHEN 650 MG: 325 TABLET ORAL at 13:31

## 2024-03-24 ASSESSMENT — PAIN DESCRIPTION - DESCRIPTORS
DESCRIPTORS: ACHING
DESCRIPTORS: PATIENT UNABLE TO DESCRIBE

## 2024-03-24 ASSESSMENT — PAIN DESCRIPTION - LOCATION
LOCATION: ARM
LOCATION: ARM
LOCATION: SHOULDER;NECK
LOCATION: ARM

## 2024-03-24 ASSESSMENT — PAIN - FUNCTIONAL ASSESSMENT
PAIN_FUNCTIONAL_ASSESSMENT: ACTIVITIES ARE NOT PREVENTED

## 2024-03-24 ASSESSMENT — PAIN SCALES - GENERAL
PAINLEVEL_OUTOF10: 6
PAINLEVEL_OUTOF10: 8
PAINLEVEL_OUTOF10: 6

## 2024-03-24 ASSESSMENT — PAIN DESCRIPTION - ORIENTATION
ORIENTATION: RIGHT

## 2024-03-24 ASSESSMENT — PAIN SCALES - WONG BAKER: WONGBAKER_NUMERICALRESPONSE: HURTS EVEN MORE

## 2024-03-24 NOTE — BH NOTE
This nurse attempts to check pt bgl but he refuses to allow. Pt repeats that he does not want this nurse to \"burn his fingers\". This nurse reassures pt that it is a stick to check his sugar level but pt does not seem to comprehend. This nurse leaves pt in his room and does not obtain his glucose level.

## 2024-03-24 NOTE — PROGRESS NOTES
Patient increased confusion stating that the people came and took his medication from his home and told him that he had to pay for them. After much encouragement patient took Ibuprofen for arm pain and his metformin. Patient refused 5 units of Humalog.

## 2024-03-24 NOTE — PLAN OF CARE
Problem: Risk for Elopement  Goal: Patient will not exit the unit/facility without proper excort  Outcome: Progressing  Flowsheets (Taken 3/24/2024 1114)  Nursing Interventions for Elopement Risk:   Reduce environmental triggers   Make sure patient has all necessary personal care items     Problem: Depression  Goal: Will be euthymic at discharge  Description: INTERVENTIONS:  1. Administer medication as ordered  2. Provide emotional support via 1:1 interaction with staff  3. Encourage involvement in milieu/groups/activities  4. Monitor for social isolation  Outcome: Progressing     Problem: Behavior  Goal: Pt/Family maintain appropriate behavior and adhere to behavioral management agreement, if implemented  Description: INTERVENTIONS:  1. Assess patient/family's coping skills and  non-compliant behavior (including use of illegal substances)  2. Notify security of behavior or suspected illegal substances which indicate the need for search of the family and/or belongings  3. Encourage verbalization of thoughts and concerns in a socially appropriate manner  4. Utilize positive, consistent limit setting strategies supporting safety of patient, staff and others  5. Encourage participation in the decision making process about the behavioral management agreement  6. If a visitor's behavior poses a threat to safety call refer to organization policy.  7. Initiate consult with , Psychosocial CNS, Spiritual Care as appropriate  Outcome: Progressing  Flowsheets (Taken 3/24/2024 1114)  Patient/family maintains appropriate behavior and adheres to behavioral management agreement, if implemented: Assess patient/family’s coping skills and  non-compliant behavior (including use of illegal substances)     Problem: Anxiety  Goal: Will report anxiety at manageable levels  Description: INTERVENTIONS:  1. Administer medication as ordered  2. Teach and rehearse alternative coping skills  3. Provide emotional support with 1:1

## 2024-03-24 NOTE — PROGRESS NOTES
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
## 2024-03-25 LAB
GLUCOSE BLD-MCNC: 120 MG/DL (ref 70–99)
GLUCOSE BLD-MCNC: 143 MG/DL (ref 70–99)
GLUCOSE BLD-MCNC: 145 MG/DL (ref 70–99)
GLUCOSE BLD-MCNC: 219 MG/DL (ref 70–99)
PERFORMED ON: ABNORMAL

## 2024-03-25 PROCEDURE — 99233 SBSQ HOSP IP/OBS HIGH 50: CPT | Performed by: PSYCHIATRY & NEUROLOGY

## 2024-03-25 PROCEDURE — 6370000000 HC RX 637 (ALT 250 FOR IP): Performed by: PSYCHIATRY & NEUROLOGY

## 2024-03-25 PROCEDURE — 1240000000 HC EMOTIONAL WELLNESS R&B

## 2024-03-25 RX ORDER — MEMANTINE HYDROCHLORIDE 5 MG/1
10 TABLET ORAL DAILY
Status: DISCONTINUED | OUTPATIENT
Start: 2024-03-26 | End: 2024-03-29 | Stop reason: HOSPADM

## 2024-03-25 RX ADMIN — PERPHENAZINE 4 MG: 4 TABLET, FILM COATED ORAL at 20:12

## 2024-03-25 RX ADMIN — ACETAMINOPHEN 650 MG: 325 TABLET ORAL at 10:48

## 2024-03-25 RX ADMIN — MEMANTINE HYDROCHLORIDE 5 MG: 5 TABLET ORAL at 08:37

## 2024-03-25 RX ADMIN — ALPRAZOLAM 0.25 MG: 0.25 TABLET ORAL at 10:48

## 2024-03-25 RX ADMIN — Medication 1 CAPSULE: at 13:31

## 2024-03-25 RX ADMIN — IBUPROFEN 400 MG: 400 TABLET, FILM COATED ORAL at 08:37

## 2024-03-25 RX ADMIN — FAMOTIDINE 20 MG: 20 TABLET ORAL at 08:37

## 2024-03-25 RX ADMIN — Medication 5 MG: at 20:12

## 2024-03-25 RX ADMIN — QUETIAPINE FUMARATE 25 MG: 25 TABLET ORAL at 20:12

## 2024-03-25 RX ADMIN — METFORMIN HYDROCHLORIDE 500 MG: 500 TABLET ORAL at 08:37

## 2024-03-25 RX ADMIN — METFORMIN HYDROCHLORIDE 500 MG: 500 TABLET ORAL at 16:54

## 2024-03-25 RX ADMIN — Medication 1 CAPSULE: at 08:37

## 2024-03-25 RX ADMIN — IBUPROFEN 400 MG: 400 TABLET, FILM COATED ORAL at 16:54

## 2024-03-25 ASSESSMENT — PAIN SCALES - GENERAL
PAINLEVEL_OUTOF10: 8
PAINLEVEL_OUTOF10: 0
PAINLEVEL_OUTOF10: 6
PAINLEVEL_OUTOF10: 8

## 2024-03-25 ASSESSMENT — PAIN DESCRIPTION - ORIENTATION
ORIENTATION: RIGHT

## 2024-03-25 ASSESSMENT — PAIN DESCRIPTION - LOCATION
LOCATION: ARM
LOCATION: ARM
LOCATION: SHOULDER;ARM

## 2024-03-25 ASSESSMENT — PAIN - FUNCTIONAL ASSESSMENT
PAIN_FUNCTIONAL_ASSESSMENT: PREVENTS OR INTERFERES SOME ACTIVE ACTIVITIES AND ADLS
PAIN_FUNCTIONAL_ASSESSMENT: PREVENTS OR INTERFERES SOME ACTIVE ACTIVITIES AND ADLS
PAIN_FUNCTIONAL_ASSESSMENT: ACTIVITIES ARE NOT PREVENTED

## 2024-03-25 ASSESSMENT — PAIN SCALES - WONG BAKER: WONGBAKER_NUMERICALRESPONSE: HURTS EVEN MORE

## 2024-03-25 ASSESSMENT — PAIN DESCRIPTION - DESCRIPTORS
DESCRIPTORS: ACHING

## 2024-03-25 NOTE — PROGRESS NOTES
Department of Psychiatry  AttendingProgress Note  Chief Complaint: dementia  Adonis is less agitated about the pain his shoulder but still focused on it. He appears brighter affectively and more talkative. Remains disoriented. He is flirtatious with female nurse.   Mood bright.   Continue current meds but increase Namenda to 10 mg QD.    Patient's chart was reviewed and collaborated with  about the treatment plan.  SUBJECTIVE:    Patient is feeling better. Suicidal ideation:  denies suicidal ideation.  Patient does not have medication side effects.    ROS: Patient has new complaints: no  Sleeping adequately:  Yes   Appetite adequate: No:   Attending groups: Yes  Visitors:No    OBJECTIVE    Physical  VITALS:  /89   Pulse 68   Temp 98.6 °F (37 °C) (Temporal)   Resp 16   Ht 1.676 m (5' 5.98\")   Wt 65.8 kg (145 lb)   SpO2 95%   BMI 23.41 kg/m²     Mental Status Examination:  Patients appearance was ill-appearing. Thoughts are Illogical. Homicidal ideations none.  No abnormal movements, tics or mannerisms.  Memory impaired [place] 0. Concentration Poor.   Alert and oriented X 4. Insight and Judgement impaired insight. Patient was distracted. Patient gait normal. Mood labile, affect labile affect Hallucinations Absent, suicidal ideations no specific plan to harm self Speech normal volume  Data  Labs:   Admission on 03/20/2024   Component Date Value Ref Range Status    WBC 03/20/2024 8.0  4.0 - 11.0 K/uL Final    RBC 03/20/2024 4.25  4.20 - 5.90 M/uL Final    Hemoglobin 03/20/2024 13.5  13.5 - 17.5 g/dL Final    Hematocrit 03/20/2024 40.8  40.5 - 52.5 % Final    MCV 03/20/2024 95.9  80.0 - 100.0 fL Final    MCH 03/20/2024 31.7  26.0 - 34.0 pg Final    MCHC 03/20/2024 33.1  31.0 - 36.0 g/dL Final    RDW 03/20/2024 15.5 (H)  12.4 - 15.4 % Final    Platelets 03/20/2024 151  135 - 450 K/uL Final    MPV 03/20/2024 8.4  5.0 - 10.5 fL Final    Neutrophils % 03/20/2024 68.2  % Final    Lymphocytes %

## 2024-03-25 NOTE — BH NOTE
Pt refuses to allow his bp to be obtained and he also refuses to allow his bgl to be obtained. Pt does so calmly and politely but refuses, even after encouragement and education.

## 2024-03-25 NOTE — PLAN OF CARE
Problem: Depression  Goal: Will be euthymic at discharge  Description: INTERVENTIONS:  1. Administer medication as ordered  2. Provide emotional support via 1:1 interaction with staff  3. Encourage involvement in milieu/groups/activities  4. Monitor for social isolation  Outcome: Progressing     Problem: Behavior  Goal: Pt/Family maintain appropriate behavior and adhere to behavioral management agreement, if implemented  Description: INTERVENTIONS:  1. Assess patient/family's coping skills and  non-compliant behavior (including use of illegal substances)  2. Notify security of behavior or suspected illegal substances which indicate the need for search of the family and/or belongings  3. Encourage verbalization of thoughts and concerns in a socially appropriate manner  4. Utilize positive, consistent limit setting strategies supporting safety of patient, staff and others  5. Encourage participation in the decision making process about the behavioral management agreement  6. If a visitor's behavior poses a threat to safety call refer to organization policy.  7. Initiate consult with , Psychosocial CNS, Spiritual Care as appropriate  Outcome: Progressing  Flowsheets (Taken 3/25/2024 1039)  Patient/family maintains appropriate behavior and adheres to behavioral management agreement, if implemented: Assess patient/family’s coping skills and  non-compliant behavior (including use of illegal substances)     Problem: Anxiety  Goal: Will report anxiety at manageable levels  Description: INTERVENTIONS:  1. Administer medication as ordered  2. Teach and rehearse alternative coping skills  3. Provide emotional support with 1:1 interaction with staff  Outcome: Progressing  Flowsheets (Taken 3/25/2024 1039)  Will report anxiety at manageable levels:   Administer medication as ordered   Provide emotional support with 1:1 interaction with staff   Teach and rehearse alternative coping skills     Problem: Chronic

## 2024-03-25 NOTE — PLAN OF CARE
3/24/2024 @ 0123  Pt has been a bit calmer this evening. He does have one episode where he wanders into another pt's room and when it's attempted to redirect him out, he becomes agitated. He then begins to grab at the linens on the bed and attempts to stuff them down into his hospital gown. Eventually pt is redirected out into the common area. He is given his 2100 meds whole along with his PRN Xanax 0.25mg PO for his anxiety at that time. Pt provided with a snack and sits down in the dining room. Pt calms and enters conversation with this nurse. He denies all at this time. Denies AVH and not noted to be RTIS. At this time pt is in bed with eyes closed. Will cont to monitor.       Problem: Depression  Goal: Will be euthymic at discharge  Description: INTERVENTIONS:  1. Administer medication as ordered  2. Provide emotional support via 1:1 interaction with staff  3. Encourage involvement in milieu/groups/activities  4. Monitor for social isolation  3/25/2024 0122 by Tessa Bay RN  Outcome: Not Progressing  3/24/2024 1446 by Anitha Aguilar RN  Outcome: Progressing     Problem: Anxiety  Goal: Will report anxiety at manageable levels  Description: INTERVENTIONS:  1. Administer medication as ordered  2. Teach and rehearse alternative coping skills  3. Provide emotional support with 1:1 interaction with staff  3/25/2024 0122 by Tessa Bay RN  Outcome: Not Progressing  3/24/2024 1446 by Anitha Aguilar RN  Outcome: Progressing  Flowsheets (Taken 3/24/2024 1114)  Will report anxiety at manageable levels:   Administer medication as ordered   Provide emotional support with 1:1 interaction with staff     Problem: Pain  Goal: Verbalizes/displays adequate comfort level or baseline comfort level  3/25/2024 0122 by Tessa Bay RN  Outcome: Not Progressing  3/24/2024 1446 by Anitha Aguilar RN  Outcome: Progressing

## 2024-03-25 NOTE — PROGRESS NOTES
Patient grimacing in pain holding right arm. Patient requested and administered Tylenol for pain. See MAR.

## 2024-03-25 NOTE — PROGRESS NOTES
Patient tearful, guarded and reporting increased anxiety. Patient administered Xanax 0.25mg per MD order. See MAR.

## 2024-03-25 NOTE — PROGRESS NOTES
Patient refused Humalog 5 units scheduled for breakfast and lunch. Patient states he hasn't taken shot in a long time. Patient educated on the importance of taking insulin and diet.

## 2024-03-26 LAB
GLUCOSE BLD-MCNC: 125 MG/DL (ref 70–99)
GLUCOSE BLD-MCNC: 140 MG/DL (ref 70–99)
GLUCOSE BLD-MCNC: 144 MG/DL (ref 70–99)
GLUCOSE BLD-MCNC: 174 MG/DL (ref 70–99)
PERFORMED ON: ABNORMAL

## 2024-03-26 PROCEDURE — 1240000000 HC EMOTIONAL WELLNESS R&B

## 2024-03-26 PROCEDURE — 99233 SBSQ HOSP IP/OBS HIGH 50: CPT | Performed by: PSYCHIATRY & NEUROLOGY

## 2024-03-26 PROCEDURE — 6370000000 HC RX 637 (ALT 250 FOR IP): Performed by: PSYCHIATRY & NEUROLOGY

## 2024-03-26 RX ADMIN — IBUPROFEN 400 MG: 400 TABLET, FILM COATED ORAL at 16:25

## 2024-03-26 RX ADMIN — Medication 5 MG: at 19:48

## 2024-03-26 RX ADMIN — FAMOTIDINE 20 MG: 20 TABLET ORAL at 07:56

## 2024-03-26 RX ADMIN — ACETAMINOPHEN 650 MG: 325 TABLET ORAL at 19:48

## 2024-03-26 RX ADMIN — MEMANTINE HYDROCHLORIDE 10 MG: 5 TABLET ORAL at 07:56

## 2024-03-26 RX ADMIN — Medication 1 CAPSULE: at 14:09

## 2024-03-26 RX ADMIN — ACETAMINOPHEN 650 MG: 325 TABLET ORAL at 14:12

## 2024-03-26 RX ADMIN — Medication 1 CAPSULE: at 07:55

## 2024-03-26 RX ADMIN — IBUPROFEN 400 MG: 400 TABLET, FILM COATED ORAL at 07:56

## 2024-03-26 RX ADMIN — PERPHENAZINE 4 MG: 4 TABLET, FILM COATED ORAL at 19:48

## 2024-03-26 RX ADMIN — METFORMIN HYDROCHLORIDE 500 MG: 500 TABLET ORAL at 07:55

## 2024-03-26 RX ADMIN — METFORMIN HYDROCHLORIDE 500 MG: 500 TABLET ORAL at 16:25

## 2024-03-26 ASSESSMENT — PAIN - FUNCTIONAL ASSESSMENT
PAIN_FUNCTIONAL_ASSESSMENT: PREVENTS OR INTERFERES SOME ACTIVE ACTIVITIES AND ADLS

## 2024-03-26 ASSESSMENT — PAIN DESCRIPTION - LOCATION
LOCATION: ARM;SHOULDER;BACK
LOCATION: ARM;SHOULDER
LOCATION: ARM;SHOULDER
LOCATION: SHOULDER;ARM

## 2024-03-26 ASSESSMENT — PAIN DESCRIPTION - DESCRIPTORS
DESCRIPTORS: ACHING;STABBING
DESCRIPTORS: ACHING
DESCRIPTORS: ACHING;STABBING
DESCRIPTORS: ACHING;STABBING

## 2024-03-26 ASSESSMENT — PAIN SCALES - GENERAL
PAINLEVEL_OUTOF10: 8
PAINLEVEL_OUTOF10: 0

## 2024-03-26 ASSESSMENT — PAIN SCALES - WONG BAKER: WONGBAKER_NUMERICALRESPONSE: NO HURT

## 2024-03-26 ASSESSMENT — PAIN DESCRIPTION - ORIENTATION
ORIENTATION: RIGHT
ORIENTATION: RIGHT;LOWER
ORIENTATION: RIGHT
ORIENTATION: RIGHT

## 2024-03-26 NOTE — PLAN OF CARE
Problem: Risk for Elopement  Goal: Patient will not exit the unit/facility without proper excort  Outcome: Progressing     Problem: Depression  Goal: Will be euthymic at discharge  Description: INTERVENTIONS:  1. Administer medication as ordered  2. Provide emotional support via 1:1 interaction with staff  3. Encourage involvement in milieu/groups/activities  4. Monitor for social isolation  Outcome: Progressing     Problem: Behavior  Goal: Pt/Family maintain appropriate behavior and adhere to behavioral management agreement, if implemented  Description: INTERVENTIONS:  1. Assess patient/family's coping skills and  non-compliant behavior (including use of illegal substances)  2. Notify security of behavior or suspected illegal substances which indicate the need for search of the family and/or belongings  3. Encourage verbalization of thoughts and concerns in a socially appropriate manner  4. Utilize positive, consistent limit setting strategies supporting safety of patient, staff and others  5. Encourage participation in the decision making process about the behavioral management agreement  6. If a visitor's behavior poses a threat to safety call refer to organization policy.  7. Initiate consult with , Psychosocial CNS, Spiritual Care as appropriate  Outcome: Progressing     Problem: Anxiety  Goal: Will report anxiety at manageable levels  Description: INTERVENTIONS:  1. Administer medication as ordered  2. Teach and rehearse alternative coping skills  3. Provide emotional support with 1:1 interaction with staff  Outcome: Progressing     Problem: Safety - Adult  Goal: Free from fall injury  Outcome: Progressing   Patient pleasant and cooperative. Patient isolative to room. Patient telling jokes, smiling and laughing. Patient medication compliant except for Humalog. Patient has no exit seeking behaviors. Patient denies SI/HI, AVH. Patient continues to report pain Tylenol and Ibuprofen given.

## 2024-03-26 NOTE — PROGRESS NOTES
Patient has had no aggressive behaviors. Patient has been joking around, laughing and smiling throughout the day.

## 2024-03-26 NOTE — PROGRESS NOTES
Pt is A+O to self only, friendly, cooperative and medication compliant.  He denies SI/HI/AVH and no RTIS noted.  He is withdrawn to his room.

## 2024-03-26 NOTE — PROGRESS NOTES
Patient reporting increased pain to right shoulder and arm. Patient requested and given Tylenol. See MAR

## 2024-03-26 NOTE — PLAN OF CARE
Pt has had no exit seeking behaviors or elopement attempts.  He remains withdrawn to his room.    Problem: Risk for Elopement  Goal: Patient will not exit the unit/facility without proper excort  Outcome: Progressing

## 2024-03-26 NOTE — PROGRESS NOTES
Department of Psychiatry  AttendingProgress Note  Chief Complaint: dementia   Adonis in bed. He has appeared sedated today . Will dc Seroquel at night.   Relatively cooperative.   Confused and disoriented.   Identify NH placement  Patient's chart was reviewed and collaborated with  about the treatment plan.  SUBJECTIVE:    Patient is feeling unchanged. Suicidal ideation:  denies suicidal ideation.  Patient does not have medication side effects.    ROS: Patient has new complaints: no  Sleeping adequately:  Yes   Appetite adequate: Yes  Attending groups: Yes  Visitors:No    OBJECTIVE    Physical  VITALS:  /81   Pulse 56   Temp 97.5 °F (36.4 °C) (Oral)   Resp 16   Ht 1.676 m (5' 5.98\")   Wt 65.8 kg (145 lb)   SpO2 100%   BMI 23.41 kg/m²     Mental Status Examination:  Patients appearance was hospital attire. Thoughts are [place] of Ideas. Homicidal ideations none.  No abnormal movements, tics or mannerisms.  Memory impaired [place] 0. Concentration Poor.   Alert and oriented X 4. Insight and Judgement impaired insight. Patient was distracted. Patient gait abnormal. Mood constricted, affect flat affect Hallucinations Absent, suicidal ideations no specific plan to harm self Speech normal volume  Data  Labs:   Admission on 03/20/2024   Component Date Value Ref Range Status    WBC 03/20/2024 8.0  4.0 - 11.0 K/uL Final    RBC 03/20/2024 4.25  4.20 - 5.90 M/uL Final    Hemoglobin 03/20/2024 13.5  13.5 - 17.5 g/dL Final    Hematocrit 03/20/2024 40.8  40.5 - 52.5 % Final    MCV 03/20/2024 95.9  80.0 - 100.0 fL Final    MCH 03/20/2024 31.7  26.0 - 34.0 pg Final    MCHC 03/20/2024 33.1  31.0 - 36.0 g/dL Final    RDW 03/20/2024 15.5 (H)  12.4 - 15.4 % Final    Platelets 03/20/2024 151  135 - 450 K/uL Final    MPV 03/20/2024 8.4  5.0 - 10.5 fL Final    Neutrophils % 03/20/2024 68.2  % Final    Lymphocytes % 03/20/2024 18.2  % Final    Monocytes % 03/20/2024 8.9  % Final    Eosinophils % 03/20/2024 3.8  %  03/23/2024 ACCU-CHEK   Final    POC Glucose 03/24/2024 123 (H)  70 - 99 mg/dl Final    Performed on 03/24/2024 ACCU-CHEK   Final    POC Glucose 03/24/2024 138 (H)  70 - 99 mg/dl Final    Performed on 03/24/2024 ACCU-CHEK   Final    POC Glucose 03/24/2024 127 (H)  70 - 99 mg/dl Final    Performed on 03/24/2024 ACCU-CHEK   Final    POC Glucose 03/25/2024 145 (H)  70 - 99 mg/dl Final    Performed on 03/25/2024 ACCU-CHEK   Final    POC Glucose 03/25/2024 143 (H)  70 - 99 mg/dl Final    Performed on 03/25/2024 ACCU-CHEK   Final    POC Glucose 03/25/2024 120 (H)  70 - 99 mg/dl Final    Performed on 03/25/2024 ACCU-CHEK   Final    POC Glucose 03/25/2024 219 (H)  70 - 99 mg/dl Final    Performed on 03/25/2024 ACCU-CHEK   Final    POC Glucose 03/26/2024 140 (H)  70 - 99 mg/dl Final    Performed on 03/26/2024 ACCU-CHEK   Final    POC Glucose 03/26/2024 125 (H)  70 - 99 mg/dl Final    Performed on 03/26/2024 ACCU-CHEK   Final            Medications  Current Facility-Administered Medications: memantine (NAMENDA) tablet 10 mg, 10 mg, Oral, Daily  perphenazine tablet 4 mg, 4 mg, Oral, Nightly  acetaminophen (TYLENOL) tablet 650 mg, 650 mg, Oral, Q4H PRN  ibuprofen (ADVIL;MOTRIN) tablet 400 mg, 400 mg, Oral, Q6H PRN  magnesium hydroxide (MILK OF MAGNESIA) 400 MG/5ML suspension 30 mL, 30 mL, Oral, Daily PRN  nicotine (NICODERM CQ) 21 MG/24HR 1 patch, 1 patch, TransDERmal, Daily  nicotine polacrilex (COMMIT) lozenge 2 mg, 2 mg, Oral, Q1H PRN  aluminum & magnesium hydroxide-simethicone (MAALOX) 200-200-20 MG/5ML suspension 30 mL, 30 mL, Oral, Q6H PRN  OLANZapine (ZYPREXA) tablet 5 mg, 5 mg, Oral, Q4H PRN **OR** OLANZapine (ZyPREXA) 5 mg in sterile water 1 mL injection, 5 mg, IntraMUSCular, Q4H PRN  benztropine mesylate (COGENTIN) injection 2 mg, 2 mg, IntraMUSCular, BID PRN  melatonin disintegrating tablet 5 mg, 5 mg, Oral, Nightly  hydrOXYzine HCl (ATARAX) tablet 25 mg, 25 mg, Oral, Q6H PRN  famotidine (PEPCID) tablet 20 mg, 20 mg,

## 2024-03-27 LAB
GLUCOSE BLD-MCNC: 127 MG/DL (ref 70–99)
GLUCOSE BLD-MCNC: 158 MG/DL (ref 70–99)
GLUCOSE BLD-MCNC: 163 MG/DL (ref 70–99)
GLUCOSE BLD-MCNC: 177 MG/DL (ref 70–99)
PERFORMED ON: ABNORMAL

## 2024-03-27 PROCEDURE — 6370000000 HC RX 637 (ALT 250 FOR IP): Performed by: PSYCHIATRY & NEUROLOGY

## 2024-03-27 PROCEDURE — 6370000000 HC RX 637 (ALT 250 FOR IP)

## 2024-03-27 PROCEDURE — 1240000000 HC EMOTIONAL WELLNESS R&B

## 2024-03-27 PROCEDURE — 99233 SBSQ HOSP IP/OBS HIGH 50: CPT | Performed by: PSYCHIATRY & NEUROLOGY

## 2024-03-27 RX ORDER — INSULIN LISPRO 100 [IU]/ML
0-4 INJECTION, SOLUTION INTRAVENOUS; SUBCUTANEOUS NIGHTLY
Status: DISCONTINUED | OUTPATIENT
Start: 2024-03-27 | End: 2024-03-29 | Stop reason: HOSPADM

## 2024-03-27 RX ORDER — INSULIN LISPRO 100 [IU]/ML
0-4 INJECTION, SOLUTION INTRAVENOUS; SUBCUTANEOUS
Status: DISCONTINUED | OUTPATIENT
Start: 2024-03-28 | End: 2024-03-29 | Stop reason: HOSPADM

## 2024-03-27 RX ADMIN — Medication 5 MG: at 20:49

## 2024-03-27 RX ADMIN — DICLOFENAC SODIUM 2 G: 10 GEL TOPICAL at 13:13

## 2024-03-27 RX ADMIN — MEMANTINE HYDROCHLORIDE 10 MG: 5 TABLET ORAL at 08:23

## 2024-03-27 RX ADMIN — Medication 1 CAPSULE: at 08:24

## 2024-03-27 RX ADMIN — FAMOTIDINE 20 MG: 20 TABLET ORAL at 08:24

## 2024-03-27 RX ADMIN — METFORMIN HYDROCHLORIDE 500 MG: 500 TABLET ORAL at 08:24

## 2024-03-27 RX ADMIN — ACETAMINOPHEN 650 MG: 325 TABLET ORAL at 10:41

## 2024-03-27 RX ADMIN — METFORMIN HYDROCHLORIDE 500 MG: 500 TABLET ORAL at 17:11

## 2024-03-27 RX ADMIN — IBUPROFEN 400 MG: 400 TABLET, FILM COATED ORAL at 08:56

## 2024-03-27 RX ADMIN — DICLOFENAC SODIUM 2 G: 10 GEL TOPICAL at 20:52

## 2024-03-27 RX ADMIN — ACETAMINOPHEN 650 MG: 325 TABLET ORAL at 23:05

## 2024-03-27 RX ADMIN — Medication 1 CAPSULE: at 13:13

## 2024-03-27 RX ADMIN — ALPRAZOLAM 0.25 MG: 0.25 TABLET ORAL at 23:20

## 2024-03-27 RX ADMIN — PERPHENAZINE 4 MG: 4 TABLET, FILM COATED ORAL at 20:49

## 2024-03-27 RX ADMIN — IBUPROFEN 400 MG: 400 TABLET, FILM COATED ORAL at 17:31

## 2024-03-27 RX ADMIN — ALPRAZOLAM 0.25 MG: 0.25 TABLET ORAL at 13:32

## 2024-03-27 ASSESSMENT — PAIN SCALES - PAIN ASSESSMENT IN ADVANCED DEMENTIA (PAINAD)
CONSOLABILITY: DISTRACTED OR REASSURED BY VOICE/TOUCH
FACIALEXPRESSION: SMILING OR INEXPRESSIVE
TOTALSCORE: 5
FACIALEXPRESSION: SMILING OR INEXPRESSIVE
BODYLANGUAGE: TENSE, DISTRESSED PACING, FIDGETING
TOTALSCORE: 1
BODYLANGUAGE: TENSE, DISTRESSED PACING, FIDGETING
BREATHING: NORMAL
TOTALSCORE: 5
FACIALEXPRESSION: FACIAL GRIMACING
NEGVOCALIZATION: OCCASIONAL MOAN/GROAN, LOW SPEECH, NEGATIVE/DISAPPROVING QUALITY
BREATHING: NORMAL
FACIALEXPRESSION: FACIAL GRIMACING
CONSOLABILITY: NO NEED TO CONSOLE
BREATHING: NORMAL
CONSOLABILITY: NO NEED TO CONSOLE
NEGVOCALIZATION: OCCASIONAL MOAN/GROAN, LOW SPEECH, NEGATIVE/DISAPPROVING QUALITY
BREATHING: NORMAL
TOTALSCORE: 0
NEGVOCALIZATION: OCCASIONAL MOAN/GROAN, LOW SPEECH, NEGATIVE/DISAPPROVING QUALITY
CONSOLABILITY: DISTRACTED OR REASSURED BY VOICE/TOUCH
BREATHING: NORMAL
BODYLANGUAGE: RELAXED
TOTALSCORE: 3
CONSOLABILITY: NO NEED TO CONSOLE
BODYLANGUAGE: TENSE, DISTRESSED PACING, FIDGETING
BODYLANGUAGE: RELAXED
FACIALEXPRESSION: FACIAL GRIMACING

## 2024-03-27 ASSESSMENT — PAIN SCALES - GENERAL
PAINLEVEL_OUTOF10: 7
PAINLEVEL_OUTOF10: 7

## 2024-03-27 ASSESSMENT — PAIN DESCRIPTION - ORIENTATION
ORIENTATION: LEFT
ORIENTATION: RIGHT

## 2024-03-27 ASSESSMENT — PAIN - FUNCTIONAL ASSESSMENT: PAIN_FUNCTIONAL_ASSESSMENT: ACTIVITIES ARE NOT PREVENTED

## 2024-03-27 ASSESSMENT — PAIN SCALES - WONG BAKER: WONGBAKER_NUMERICALRESPONSE: NO HURT

## 2024-03-27 ASSESSMENT — PAIN DESCRIPTION - DESCRIPTORS: DESCRIPTORS: ACHING

## 2024-03-27 ASSESSMENT — PAIN DESCRIPTION - LOCATION
LOCATION: SHOULDER
LOCATION: ARM

## 2024-03-27 NOTE — PROGRESS NOTES
Behavioral Services                                              Medicare Re-Certification    I certify that the inpatient psychiatric hospital services furnished since the previous certification/re-certification were, and continue to be, medically necessary for;    [x] (1) Treatment which could reasonably be expected to improve the patient's condition,    [x] (2) Or for diagnostic study.    Estimated length of stay/service 5 d    Plan for post-hospital care NH    This patient continues to need, on a daily basis, active treatment furnished directly by or requiring the supervision of inpatient psychiatric personnel.    Electronically signed by GABINO TREVIÑO MD on 3/27/2024 at 1:47 PM

## 2024-03-27 NOTE — PLAN OF CARE
Problem: Risk for Elopement  Goal: Patient will not exit the unit/facility without proper excort  3/26/2024 2207 by Terrie Sanchez RN  Outcome: Progressing     Problem: Depression  Goal: Will be euthymic at discharge  Description: INTERVENTIONS:  1. Administer medication as ordered  2. Provide emotional support via 1:1 interaction with staff  3. Encourage involvement in milieu/groups/activities  4. Monitor for social isolation  3/26/2024 2207 by Terrie Sanchez RN  Outcome: Progressing     Problem: Behavior  Goal: Pt/Family maintain appropriate behavior and adhere to behavioral management agreement, if implemented  Description: INTERVENTIONS:  1. Assess patient/family's coping skills and  non-compliant behavior (including use of illegal substances)  2. Notify security of behavior or suspected illegal substances which indicate the need for search of the family and/or belongings  3. Encourage verbalization of thoughts and concerns in a socially appropriate manner  4. Utilize positive, consistent limit setting strategies supporting safety of patient, staff and others  5. Encourage participation in the decision making process about the behavioral management agreement  6. If a visitor's behavior poses a threat to safety call refer to organization policy.  7. Initiate consult with , Psychosocial CNS, Spiritual Care as appropriate  3/26/2024 2207 by Terrie Sanchez RN  Outcome: Progressing     Problem: Anxiety  Goal: Will report anxiety at manageable levels  Description: INTERVENTIONS:  1. Administer medication as ordered  2. Teach and rehearse alternative coping skills  3. Provide emotional support with 1:1 interaction with staff  3/26/2024 2207 by Terrie Sanchez RN  Outcome: Progressing     Problem: Chronic Conditions and Co-morbidities  Goal: Patient's chronic conditions and co-morbidity symptoms are monitored and maintained or improved  3/26/2024 2207 by Terrie Sanchez RN  Outcome:  Progressing     Problem: Pain  Goal: Verbalizes/displays adequate comfort level or baseline comfort level  Outcome: Progressing     Problem: Safety - Adult  Goal: Free from fall injury  3/26/2024 2207 by Terrie Sanchez RN  Outcome: Progressing    Patient pleasantly confused. Calm and cooperative with care. Withdrawn to room. Denies SI/HI, AH/VH. No response to internal stimulus noted. Complains of pain to right shoulder and arm. Unable to rate level of pain. Tylenol 675 mg given at 19:48.  Positive effects observed. Rests with eyes closed. Calm expression. Respirations regular and even.

## 2024-03-27 NOTE — PROGRESS NOTES
Department of Psychiatry  AttendingProgress Note  Chief Complaint: dementia  Adonis is bright affectively and flirtatious with nursing.   He is still complaining about his arm but less so as time goes on.   No aggression  Identify NH     Patient's chart was reviewed and collaborated with  about the treatment plan.  SUBJECTIVE:    Patient is feeling better. Suicidal ideation:  denies suicidal ideation.  Patient does not have medication side effects.    ROS: Patient has new complaints: no  Sleeping adequately:  Yes   Appetite adequate: Yes  Attending groups: Yes  Visitors:No    OBJECTIVE    Physical  VITALS:  BP (!) 141/85   Pulse 95   Temp 97.2 °F (36.2 °C) (Oral)   Resp 18   Ht 1.676 m (5' 5.98\")   Wt 65.8 kg (145 lb)   SpO2 98%   BMI 23.41 kg/m²     Mental Status Examination:  Patients appearance was hospital attire. Thoughts are [place]. Homicidal ideations none.  No abnormal movements, tics or mannerisms.  Memory impaired [place] 0. Concentration Poor.   Alert and oriented X 4. Insight and Judgement impaired insight. Patient was distracted. Patient gait abnormal. Mood within normal limits, affect normal affect Hallucinations Absent, suicidal ideations no specific plan to harm self Speech normal volume  Data  Labs:   Admission on 03/20/2024   Component Date Value Ref Range Status    WBC 03/20/2024 8.0  4.0 - 11.0 K/uL Final    RBC 03/20/2024 4.25  4.20 - 5.90 M/uL Final    Hemoglobin 03/20/2024 13.5  13.5 - 17.5 g/dL Final    Hematocrit 03/20/2024 40.8  40.5 - 52.5 % Final    MCV 03/20/2024 95.9  80.0 - 100.0 fL Final    MCH 03/20/2024 31.7  26.0 - 34.0 pg Final    MCHC 03/20/2024 33.1  31.0 - 36.0 g/dL Final    RDW 03/20/2024 15.5 (H)  12.4 - 15.4 % Final    Platelets 03/20/2024 151  135 - 450 K/uL Final    MPV 03/20/2024 8.4  5.0 - 10.5 fL Final    Neutrophils % 03/20/2024 68.2  % Final    Lymphocytes % 03/20/2024 18.2  % Final    Monocytes % 03/20/2024 8.9  % Final    Eosinophils % 03/20/2024

## 2024-03-27 NOTE — PLAN OF CARE
Problem: Risk for Elopement  Goal: Patient will not exit the unit/facility without proper excort  3/27/2024 1048 by Meri Haley RN  Outcome: Progressing  3/26/2024 2207 by Terrie Sanchez RN  Outcome: Progressing     Problem: Depression  Goal: Will be euthymic at discharge  Description: INTERVENTIONS:  1. Administer medication as ordered  2. Provide emotional support via 1:1 interaction with staff  3. Encourage involvement in milieu/groups/activities  4. Monitor for social isolation  3/27/2024 1048 by Meri Haley RN  Outcome: Progressing  3/26/2024 2207 by Terrie Sanchez RN  Outcome: Progressing     Problem: Behavior  Goal: Pt/Family maintain appropriate behavior and adhere to behavioral management agreement, if implemented  Description: INTERVENTIONS:  1. Assess patient/family's coping skills and  non-compliant behavior (including use of illegal substances)  2. Notify security of behavior or suspected illegal substances which indicate the need for search of the family and/or belongings  3. Encourage verbalization of thoughts and concerns in a socially appropriate manner  4. Utilize positive, consistent limit setting strategies supporting safety of patient, staff and others  5. Encourage participation in the decision making process about the behavioral management agreement  6. If a visitor's behavior poses a threat to safety call refer to organization policy.  7. Initiate consult with , Psychosocial CNS, Spiritual Care as appropriate  3/27/2024 1048 by Meri Haley RN  Outcome: Progressing  3/26/2024 2207 by Terrie Sanchez RN  Outcome: Progressing     Problem: Anxiety  Goal: Will report anxiety at manageable levels  Description: INTERVENTIONS:  1. Administer medication as ordered  2. Teach and rehearse alternative coping skills  3. Provide emotional support with 1:1 interaction with staff  3/27/2024 1048 by Meri Haley RN  Outcome: Progressing  3/26/2024 2207

## 2024-03-27 NOTE — BH NOTE
Spoke with pt's son and provided an update on pt's progress, treatment plan, and placement progress. They requested adjusted visiting hrs, because their work schedules do not allow them to visit pt during the regular visiting hrs. This was discussed with Dr. Arnett and charge nurse, and family was approved to visit for 2 hrs during the 10:00-14:00 time window.         Spoke with Lizzy and obtained update on the placement process. She stated [facility] does not have any secure male beds at this time. She will talk to them about possibility of placement on a non-secure unit with a wander guard. She is also looking at options for placement at one of the other locations. She will call us back when she has an update.

## 2024-03-27 NOTE — GROUP NOTE
Group Therapy Note    Date: 3/27/2024    Group Start Time: 1000  Group End Time: 1045  Group Topic: Healthy Living/Wellness    [facility]    Matty Angela        Group Therapy Note    Group members collaborated in discussion of home-life, ways that they have set up their space in three categories: providing safety, comfort, and peacefulness. Group concluded with members identifying an area of gratitude in their home-life and a goal for leisure engagement in home post-discharge.    Attendees: 4       Notes:  Adonis did not attend session, invited but voicing intent to continue resting in room. He was observed with waning orientation, distractible while walking throughout unit.      Discipline Responsible: Psychoeducational Specialist      Signature:  Matty Angela, MM, MT-BC

## 2024-03-28 LAB
GLUCOSE BLD-MCNC: 141 MG/DL (ref 70–99)
GLUCOSE BLD-MCNC: 176 MG/DL (ref 70–99)
GLUCOSE BLD-MCNC: 299 MG/DL (ref 70–99)
PERFORMED ON: ABNORMAL

## 2024-03-28 PROCEDURE — 6370000000 HC RX 637 (ALT 250 FOR IP): Performed by: PSYCHIATRY & NEUROLOGY

## 2024-03-28 PROCEDURE — 1240000000 HC EMOTIONAL WELLNESS R&B

## 2024-03-28 PROCEDURE — 99233 SBSQ HOSP IP/OBS HIGH 50: CPT | Performed by: PSYCHIATRY & NEUROLOGY

## 2024-03-28 RX ORDER — MEMANTINE HYDROCHLORIDE 10 MG/1
10 TABLET ORAL DAILY
Qty: 30 TABLET | Refills: 0 | Status: SHIPPED | OUTPATIENT
Start: 2024-03-29

## 2024-03-28 RX ORDER — MECOBALAMIN 5000 MCG
5 TABLET,DISINTEGRATING ORAL NIGHTLY
Qty: 30 TABLET | Refills: 0 | Status: SHIPPED | OUTPATIENT
Start: 2024-03-28

## 2024-03-28 RX ORDER — LACTOBACILLUS RHAMNOSUS GG 10B CELL
1 CAPSULE ORAL 2 TIMES DAILY
Qty: 30 CAPSULE | Refills: 0 | Status: SHIPPED | OUTPATIENT
Start: 2024-03-28

## 2024-03-28 RX ORDER — PERPHENAZINE 4 MG/1
4 TABLET ORAL NIGHTLY
Qty: 30 TABLET | Refills: 0 | Status: SHIPPED | OUTPATIENT
Start: 2024-03-28

## 2024-03-28 RX ORDER — ALPRAZOLAM 0.25 MG/1
0.25 TABLET ORAL 3 TIMES DAILY PRN
Qty: 30 TABLET | Refills: 0 | Status: SHIPPED | OUTPATIENT
Start: 2024-03-28 | End: 2024-03-30 | Stop reason: SDUPTHER

## 2024-03-28 RX ADMIN — METFORMIN HYDROCHLORIDE 500 MG: 500 TABLET ORAL at 11:19

## 2024-03-28 RX ADMIN — MEMANTINE HYDROCHLORIDE 10 MG: 5 TABLET ORAL at 11:19

## 2024-03-28 RX ADMIN — ALPRAZOLAM 0.25 MG: 0.25 TABLET ORAL at 11:19

## 2024-03-28 RX ADMIN — Medication 1 CAPSULE: at 17:49

## 2024-03-28 RX ADMIN — PERPHENAZINE 4 MG: 4 TABLET, FILM COATED ORAL at 19:56

## 2024-03-28 RX ADMIN — DICLOFENAC SODIUM 2 G: 10 GEL TOPICAL at 19:56

## 2024-03-28 RX ADMIN — FAMOTIDINE 20 MG: 20 TABLET ORAL at 11:19

## 2024-03-28 RX ADMIN — Medication 1 CAPSULE: at 11:19

## 2024-03-28 RX ADMIN — IBUPROFEN 400 MG: 400 TABLET, FILM COATED ORAL at 19:55

## 2024-03-28 RX ADMIN — ALPRAZOLAM 0.25 MG: 0.25 TABLET ORAL at 22:03

## 2024-03-28 RX ADMIN — Medication 5 MG: at 19:56

## 2024-03-28 RX ADMIN — METFORMIN HYDROCHLORIDE 500 MG: 500 TABLET ORAL at 17:49

## 2024-03-28 ASSESSMENT — PAIN DESCRIPTION - DESCRIPTORS: DESCRIPTORS: ACHING

## 2024-03-28 ASSESSMENT — PAIN DESCRIPTION - LOCATION: LOCATION: SHOULDER

## 2024-03-28 ASSESSMENT — PAIN SCALES - PAIN ASSESSMENT IN ADVANCED DEMENTIA (PAINAD)
BREATHING: NORMAL
TOTALSCORE: 0
CONSOLABILITY: NO NEED TO CONSOLE
BODYLANGUAGE: RELAXED
FACIALEXPRESSION: SMILING OR INEXPRESSIVE

## 2024-03-28 ASSESSMENT — PAIN SCALES - GENERAL: PAINLEVEL_OUTOF10: 0

## 2024-03-28 ASSESSMENT — PAIN SCALES - WONG BAKER: WONGBAKER_NUMERICALRESPONSE: NO HURT

## 2024-03-28 ASSESSMENT — PAIN DESCRIPTION - ORIENTATION: ORIENTATION: RIGHT

## 2024-03-28 NOTE — BH NOTE
Patient alert to self,confused, nonsensical speech, pleasant and cooperative, compliant with medications whole, visible on unit social with peers. Independent with steady gait, slept well.

## 2024-03-28 NOTE — PLAN OF CARE
Problem: Risk for Elopement  Goal: Patient will not exit the unit/facility without proper excort  Outcome: Progressing  Flowsheets (Taken 3/28/2024 0138)  Nursing Interventions for Elopement Risk:   Reduce environmental triggers   Make sure patient has all necessary personal care items     Problem: Depression  Goal: Will be euthymic at discharge  Description: INTERVENTIONS:  1. Administer medication as ordered  2. Provide emotional support via 1:1 interaction with staff  3. Encourage involvement in milieu/groups/activities  4. Monitor for social isolation  Outcome: Progressing     Problem: Behavior  Goal: Pt/Family maintain appropriate behavior and adhere to behavioral management agreement, if implemented  Description: INTERVENTIONS:  1. Assess patient/family's coping skills and  non-compliant behavior (including use of illegal substances)  2. Notify security of behavior or suspected illegal substances which indicate the need for search of the family and/or belongings  3. Encourage verbalization of thoughts and concerns in a socially appropriate manner  4. Utilize positive, consistent limit setting strategies supporting safety of patient, staff and others  5. Encourage participation in the decision making process about the behavioral management agreement  6. If a visitor's behavior poses a threat to safety call refer to organization policy.  7. Initiate consult with , Psychosocial CNS, Spiritual Care as appropriate  Outcome: Progressing     Problem: Anxiety  Goal: Will report anxiety at manageable levels  Description: INTERVENTIONS:  1. Administer medication as ordered  2. Teach and rehearse alternative coping skills  3. Provide emotional support with 1:1 interaction with staff  Outcome: Progressing  Flowsheets (Taken 3/28/2024 0138)  Will report anxiety at manageable levels:   Administer medication as ordered   Provide emotional support with 1:1 interaction with staff     Problem: Chronic Conditions

## 2024-03-28 NOTE — PROGRESS NOTES
Department of Psychiatry  AttendingProgress Note  Chief Complaint: dementia   Adonis has been cooperative and easily engaged. He is active on unit. Remains disoriented.   Plan DC to [facility] tomorrow  Patient's chart was reviewed and collaborated with  about the treatment plan.  SUBJECTIVE:    Patient is feeling unchanged. Suicidal ideation:  denies suicidal ideation.  Patient does not have medication side effects.    ROS: Patient has new complaints: no  Sleeping adequately:  Yes   Appetite adequate: Yes  Attending groups: Yes  Visitors:No    OBJECTIVE    Physical  VITALS:  /78   Pulse 64   Temp 97.8 °F (36.6 °C) (Oral)   Resp 15   Ht 1.676 m (5' 5.98\")   Wt 65.8 kg (145 lb)   SpO2 98%   BMI 23.41 kg/m²     Mental Status Examination:  Patients appearance was hospital attire. Thoughts are Illogical. Homicidal ideations none.  No abnormal movements, tics or mannerisms.  Memory impaired [place] 0. Concentration Poor.   Alert and oriented X 4. Insight and Judgement impaired insight. Patient was cooperative. Patient gait abnormal. Mood labile, affect labile affect Hallucinations Absent, suicidal ideations no specific plan to harm self Speech normal volume  Data  Labs:   Admission on 03/20/2024   Component Date Value Ref Range Status    WBC 03/20/2024 8.0  4.0 - 11.0 K/uL Final    RBC 03/20/2024 4.25  4.20 - 5.90 M/uL Final    Hemoglobin 03/20/2024 13.5  13.5 - 17.5 g/dL Final    Hematocrit 03/20/2024 40.8  40.5 - 52.5 % Final    MCV 03/20/2024 95.9  80.0 - 100.0 fL Final    MCH 03/20/2024 31.7  26.0 - 34.0 pg Final    MCHC 03/20/2024 33.1  31.0 - 36.0 g/dL Final    RDW 03/20/2024 15.5 (H)  12.4 - 15.4 % Final    Platelets 03/20/2024 151  135 - 450 K/uL Final    MPV 03/20/2024 8.4  5.0 - 10.5 fL Final    Neutrophils % 03/20/2024 68.2  % Final    Lymphocytes % 03/20/2024 18.2  % Final    Monocytes % 03/20/2024 8.9  % Final    Eosinophils % 03/20/2024 3.8  % Final    Basophils % 03/20/2024  03/20/2024 228 (H)  0 - 199 mg/dL Final    Triglycerides 03/20/2024 184 (H)  0 - 150 mg/dL Final    HDL 03/20/2024 59  40 - 60 mg/dL Final    Comment: An HDL cholesterol less than 40 mg/dL is low and  constitutes a coronary heart disease risk factor.  An HDL cholesterol greater than 60 mg/dL is a  negative risk factor for coronary heart disease.      LDL Calculated 03/20/2024 132 (H)  <100 mg/dL Final    VLDL Cholesterol Calculated 03/20/2024 37  Not Established mg/dL Final    Hemoglobin A1C 03/20/2024 8.3  See comment % Final    Comment: Comment:  Diagnosis of Diabetes: > or = 6.5%  Increased risk of diabetes (Prediabetes): 5.7-6.4%  Glycemic Control: Nonpregnant Adults: <7.0%                    Pregnant: <6.0%        Estimated Avg Glucose 03/20/2024 191.5  mg/dL Final    POC Glucose 03/20/2024 218 (H)  70 - 99 mg/dl Final    Performed on 03/20/2024 ACCU-CHEK   Final    POC Glucose 03/21/2024 181 (H)  70 - 99 mg/dl Final    Performed on 03/21/2024 ACCU-CHEK   Final    T4 Free 03/20/2024 1.1  0.9 - 1.8 ng/dL Final    POC Glucose 03/21/2024 199 (H)  70 - 99 mg/dl Final    Performed on 03/21/2024 ACCU-CHEK   Final    POC Glucose 03/22/2024 161 (H)  70 - 99 mg/dl Final    Performed on 03/22/2024 ACCU-CHEK   Final    POC Glucose 03/22/2024 191 (H)  70 - 99 mg/dl Final    Performed on 03/22/2024 ACCU-CHEK   Final    POC Glucose 03/22/2024 135 (H)  70 - 99 mg/dl Final    Performed on 03/22/2024 ACCU-CHEK   Final    POC Glucose 03/22/2024 141 (H)  70 - 99 mg/dl Final    Performed on 03/22/2024 ACCU-CHEK   Final    POC Glucose 03/23/2024 136 (H)  70 - 99 mg/dl Final    Performed on 03/23/2024 ACCU-CHEK   Final    POC Glucose 03/23/2024 135 (H)  70 - 99 mg/dl Final    Performed on 03/23/2024 ACCU-CHEK   Final    POC Glucose 03/23/2024 120 (H)  70 - 99 mg/dl Final    Performed on 03/23/2024 ACCU-CHEK   Final    POC Glucose 03/23/2024 215 (H)  70 - 99 mg/dl Final    Performed on 03/23/2024 ACCU-CHEK   Final    POC Glucose

## 2024-03-28 NOTE — BH NOTE
Patient c/o right shoulder pain 7/10, PRN Tylenol 650 mg  PO administered also gave him a warm compress but unsure if he comprehends its for his shoulder. Patient labile anxious and restless Xanax 0.25 mg  PO administered, at one point patient pulled his gown up and exposed his genitals he was not wearing any underpants provided him with a pull up and he put that on, stated he was going to bed, will monitor.

## 2024-03-28 NOTE — PROGRESS NOTES
PRN Trazodone 50 mg PO for sleep and Xanax 0.25 mg PO for anxiety and restlessness  effective, patient sleeping at this time, respirations even and unlabored.

## 2024-03-28 NOTE — PLAN OF CARE
Pt is alert and oriented to self only. Pt continues to be pleasantly confused. Has been calm and cooperative. Pt was withdrawn to his room through the morning but came out to the dayroom for the rest of the day. Pt is medication compliant. Pt was incontinent of urine this morning when he woke up but was cooperative w/ care. Continent throughout the rest of the shift. Pt denies SI/HI/AVH, no RTIS noted. Will continue to monitor.    Problem: Risk for Elopement  Goal: Patient will not exit the unit/facility without proper excort  Outcome: Progressing     Problem: Depression  Goal: Will be euthymic at discharge  Description: INTERVENTIONS:  1. Administer medication as ordered  2. Provide emotional support via 1:1 interaction with staff  3. Encourage involvement in milieu/groups/activities  4. Monitor for social isolation  Outcome: Progressing     Problem: Behavior  Goal: Pt/Family maintain appropriate behavior and adhere to behavioral management agreement, if implemented  Description: INTERVENTIONS:  1. Assess patient/family's coping skills and  non-compliant behavior (including use of illegal substances)  2. Notify security of behavior or suspected illegal substances which indicate the need for search of the family and/or belongings  3. Encourage verbalization of thoughts and concerns in a socially appropriate manner  4. Utilize positive, consistent limit setting strategies supporting safety of patient, staff and others  5. Encourage participation in the decision making process about the behavioral management agreement  6. If a visitor's behavior poses a threat to safety call refer to organization policy.  7. Initiate consult with , Psychosocial CNS, Spiritual Care as appropriate  Outcome: Progressing     Problem: Anxiety  Goal: Will report anxiety at manageable levels  Description: INTERVENTIONS:  1. Administer medication as ordered  2. Teach and rehearse alternative coping skills  3. Provide emotional

## 2024-03-28 NOTE — BH NOTE
Spoke with pt's son Rashawn regarding placement. Pt has been accepted at [facility] Rashawn would like to move forward with placement at [facility] at this time.         Spoke with Lizzy in admissions with [facility] ([phone]) and coordinated pt's dc to them for tomorrow. They will reach out to the family.       Transport is scheduled through [facility] with an ETA of 14:00/14:30

## 2024-03-29 VITALS
TEMPERATURE: 98.2 F | RESPIRATION RATE: 16 BRPM | WEIGHT: 145 LBS | HEIGHT: 66 IN | BODY MASS INDEX: 23.3 KG/M2 | SYSTOLIC BLOOD PRESSURE: 112 MMHG | DIASTOLIC BLOOD PRESSURE: 68 MMHG | OXYGEN SATURATION: 94 % | HEART RATE: 78 BPM

## 2024-03-29 LAB
GLUCOSE BLD-MCNC: 187 MG/DL (ref 70–99)
PERFORMED ON: ABNORMAL

## 2024-03-29 PROCEDURE — 5130000000 HC BRIDGE APPOINTMENT

## 2024-03-29 PROCEDURE — 6370000000 HC RX 637 (ALT 250 FOR IP): Performed by: PSYCHIATRY & NEUROLOGY

## 2024-03-29 PROCEDURE — 99239 HOSP IP/OBS DSCHRG MGMT >30: CPT | Performed by: PSYCHIATRY & NEUROLOGY

## 2024-03-29 RX ADMIN — ALPRAZOLAM 0.25 MG: 0.25 TABLET ORAL at 09:47

## 2024-03-29 RX ADMIN — MEMANTINE HYDROCHLORIDE 10 MG: 5 TABLET ORAL at 09:46

## 2024-03-29 RX ADMIN — Medication 1 CAPSULE: at 14:46

## 2024-03-29 RX ADMIN — FAMOTIDINE 20 MG: 20 TABLET ORAL at 09:47

## 2024-03-29 RX ADMIN — DICLOFENAC SODIUM 2 G: 10 GEL TOPICAL at 09:45

## 2024-03-29 RX ADMIN — Medication 1 CAPSULE: at 09:47

## 2024-03-29 RX ADMIN — METFORMIN HYDROCHLORIDE 500 MG: 500 TABLET ORAL at 09:47

## 2024-03-29 NOTE — BH NOTE
Writer called [facility] to give report on pt. Report given to Director of Nursing at [facility]. Questions/concerned addressed.

## 2024-03-29 NOTE — CARE COORDINATION
Called and updated Abigail at [facility] ([phone]) regarding updated discharge time due to [facility] availability. Adonis will be discharged via [facility] transport at 1700. Abigail voiced agreement and understanding.

## 2024-03-29 NOTE — PROGRESS NOTES
Occupational Therapy  Attempted to see patient this PM. Per RN patient with discharge order and pick-up time.     Royce Jansen, OTR/L #[id number]
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
## 2024-03-29 NOTE — PLAN OF CARE
Problem: Risk for Elopement  Goal: Patient will not exit the unit/facility without proper excort  3/28/2024 2252 by Terrie Sanchez RN  Outcome: Progressing     Problem: Depression  Goal: Will be euthymic at discharge  Description: INTERVENTIONS:  1. Administer medication as ordered  2. Provide emotional support via 1:1 interaction with staff  3. Encourage involvement in milieu/groups/activities  4. Monitor for social isolation  3/28/2024 2252 by Terrie Sanchez RN  Outcome: Progressing     Problem: Behavior  Goal: Pt/Family maintain appropriate behavior and adhere to behavioral management agreement, if implemented  Description: INTERVENTIONS:  1. Assess patient/family's coping skills and  non-compliant behavior (including use of illegal substances)  2. Notify security of behavior or suspected illegal substances which indicate the need for search of the family and/or belongings  3. Encourage verbalization of thoughts and concerns in a socially appropriate manner  4. Utilize positive, consistent limit setting strategies supporting safety of patient, staff and others  5. Encourage participation in the decision making process about the behavioral management agreement  6. If a visitor's behavior poses a threat to safety call refer to organization policy.  7. Initiate consult with , Psychosocial CNS, Spiritual Care as appropriate  3/28/2024 2252 by Terrie Sanchez RN  Outcome: Progressing     Problem: Anxiety  Goal: Will report anxiety at manageable levels  Description: INTERVENTIONS:  1. Administer medication as ordered  2. Teach and rehearse alternative coping skills  3. Provide emotional support with 1:1 interaction with staff  3/28/2024 2252 by Terrie Sanchez RN  Outcome: Progressing     Problem: Chronic Conditions and Co-morbidities  Goal: Patient's chronic conditions and co-morbidity symptoms are monitored and maintained or improved  Outcome: Progressing     Problem: Pain  Goal:  Verbalizes/displays adequate comfort level or baseline comfort level  Outcome: Progressing     Problem: Safety - Adult  Goal: Free from fall injury  Outcome: Progressing     Patient visible in day room this evening. Alert and oriented to self only. Brightened and exaggerated, but cooperative with care. Watched television and interacted to others. No verbal outbursts observed. Denies SI/HI, AH/VH. No response to internal stimulus. Patient medication compliant.  Noted as more anxious as evening progressed. Xanax 0.25 mg given at 22:03. Positive effects observed. Patient returned to room. Currently rests with eyes closed. Calm expression. Respirations regular and even.

## 2024-03-29 NOTE — BH NOTE
days    Vaccinations (karen X if applicable and completed):  ( ) Patient states already received influenza vaccine elsewhere  ( ) Patient received influenza vaccine during this hospitalization  (X) Patient refused influenza vaccine at this time  ( ) Not offered    Lori Rodriguez RN

## 2024-03-30 DIAGNOSIS — F41.1 GAD (GENERALIZED ANXIETY DISORDER): ICD-10-CM

## 2024-03-30 RX ORDER — ALPRAZOLAM 0.25 MG/1
0.25 TABLET ORAL 3 TIMES DAILY PRN
Qty: 30 TABLET | Refills: 0 | Status: SHIPPED | OUTPATIENT
Start: 2024-03-30 | End: 2024-04-29

## 2024-04-01 ENCOUNTER — FOLLOWUP TELEPHONE ENCOUNTER (OUTPATIENT)
Dept: PSYCHIATRY | Age: 74
End: 2024-04-01

## 2024-04-01 NOTE — DISCHARGE SUMMARY
sleeps through the night  Appetite: ok   Reassess Lissa Risk:  no specific plan to harm self Pt has phone numbers to contact if suicidal thoughts recur and states pt will return to the hospital if suicidal feelings return.   Hospital Routine Meds:     Hospital PRN Meds:    Discharge Meds:    Discharge Medication List as of 3/30/2024 12:31 PM             Details   lactobacillus (CULTURELLE) capsule Take 1 capsule by mouth 2 times daily at 0800 and 1400, Disp-30 capsule, R-0Print      perphenazine 4 MG tablet Take 1 tablet by mouth nightly, Disp-30 tablet, R-0Print      diclofenac sodium (VOLTAREN) 1 % GEL Apply 2 g topically 2 times daily, Topical, 2 TIMES DAILY Starting Thu 3/28/2024, Disp-2 g, R-0, Print      melatonin 5 MG TBDP disintegrating tablet Take 1 tablet by mouth nightly, Disp-30 tablet, R-0Print      memantine (NAMENDA) 10 MG tablet Take 1 tablet by mouth daily, Disp-30 tablet, R-0Print                Details   ALPRAZolam (XANAX) 0.25 MG tablet Take 1 tablet by mouth 3 times daily as needed for Anxiety for up to 30 days. Max Daily Amount: 0.75 mg, Disp-30 tablet, R-0Print                Details   metFORMIN (GLUCOPHAGE) 500 MG tablet Take 1 tablet by mouth 2 times daily (with meals)Historical Med                  Disposition - Residence [facility]     Follow Up:  See Discharge Instructions     Tai Arnett MD  Physician Psychiatry

## 2024-09-30 NOTE — PLAN OF CARE
Problem: Risk for Elopement  Goal: Patient will not exit the unit/facility without proper excort  3/21/2024 2131 by Terrie Sanchez RN  Outcome: Progressing     Problem: Depression  Goal: Will be euthymic at discharge  Description: INTERVENTIONS:  1. Administer medication as ordered  2. Provide emotional support via 1:1 interaction with staff  3. Encourage involvement in milieu/groups/activities  4. Monitor for social isolation  3/21/2024 2131 by Terrie Sanchez RN  Outcome: Progressing     Problem: Chronic Conditions and Co-morbidities  Goal: Patient's chronic conditions and co-morbidity symptoms are monitored and maintained or improved  3/21/2024 2131 by Terrie Sanchez RN  Outcome: Progressing     Problem: Behavior  Goal: Pt/Family maintain appropriate behavior and adhere to behavioral management agreement, if implemented  Description: INTERVENTIONS:  1. Assess patient/family's coping skills and  non-compliant behavior (including use of illegal substances)  2. Notify security of behavior or suspected illegal substances which indicate the need for search of the family and/or belongings  3. Encourage verbalization of thoughts and concerns in a socially appropriate manner  4. Utilize positive, consistent limit setting strategies supporting safety of patient, staff and others  5. Encourage participation in the decision making process about the behavioral management agreement  6. If a visitor's behavior poses a threat to safety call refer to organization policy.  7. Initiate consult with , Psychosocial CNS, Spiritual Care as appropriate  3/21/2024 2131 by Terrie Sanchez RN  Outcome: Not Progressing  3/21/2024 1020 by Tony Amado RN  Outcome: Progressing  Flowsheets (Taken 3/21/2024 1015)  Patient/family maintains appropriate behavior and adheres to behavioral management agreement, if implemented: Assess patient/family’s coping skills and  non-compliant behavior (including use of  Stable

## 2024-11-12 ENCOUNTER — APPOINTMENT (OUTPATIENT)
Dept: CT IMAGING | Age: 74
End: 2024-11-12
Payer: COMMERCIAL

## 2024-11-12 ENCOUNTER — HOSPITAL ENCOUNTER (INPATIENT)
Age: 74
LOS: 3 days | Discharge: HOME | End: 2024-11-15
Attending: EMERGENCY MEDICINE | Admitting: PSYCHIATRY & NEUROLOGY
Payer: COMMERCIAL

## 2024-11-12 DIAGNOSIS — R46.89 AGGRESSIVE BEHAVIOR: ICD-10-CM

## 2024-11-12 DIAGNOSIS — R30.0 DYSURIA: Primary | ICD-10-CM

## 2024-11-12 LAB
ALBUMIN SERPL-MCNC: 3.6 G/DL (ref 3.4–5)
ALBUMIN/GLOB SERPL: 1 {RATIO} (ref 1.1–2.2)
ALP SERPL-CCNC: 109 U/L (ref 40–129)
ALT SERPL-CCNC: 10 U/L (ref 10–40)
AMMONIA PLAS-SCNC: 27 UMOL/L (ref 16–60)
ANION GAP SERPL CALCULATED.3IONS-SCNC: 20 MMOL/L (ref 3–16)
AST SERPL-CCNC: 40 U/L (ref 15–37)
BASOPHILS # BLD: 0 K/UL (ref 0–0.2)
BASOPHILS NFR BLD: 0 %
BILIRUB SERPL-MCNC: 0.4 MG/DL (ref 0–1)
BUN SERPL-MCNC: 18 MG/DL (ref 7–20)
CALCIUM SERPL-MCNC: 8.2 MG/DL (ref 8.3–10.6)
CHLORIDE SERPL-SCNC: 96 MMOL/L (ref 99–110)
CO2 SERPL-SCNC: 19 MMOL/L (ref 21–32)
CREAT SERPL-MCNC: 1 MG/DL (ref 0.8–1.3)
DEPRECATED RDW RBC AUTO: 14.4 % (ref 12.4–15.4)
EKG ATRIAL RATE: 112 BPM
EKG DIAGNOSIS: NORMAL
EKG P AXIS: 85 DEGREES
EKG P-R INTERVAL: 186 MS
EKG Q-T INTERVAL: 328 MS
EKG QRS DURATION: 78 MS
EKG QTC CALCULATION (BAZETT): 447 MS
EKG R AXIS: 31 DEGREES
EKG T AXIS: 66 DEGREES
EKG VENTRICULAR RATE: 112 BPM
EOSINOPHIL # BLD: 0.4 K/UL (ref 0–0.6)
EOSINOPHIL NFR BLD: 3 %
GFR SERPLBLD CREATININE-BSD FMLA CKD-EPI: 78 ML/MIN/{1.73_M2}
GLUCOSE BLD-MCNC: 178 MG/DL (ref 70–99)
GLUCOSE SERPL-MCNC: 143 MG/DL (ref 70–99)
HCT VFR BLD AUTO: 40.5 % (ref 40.5–52.5)
HGB BLD-MCNC: 13.9 G/DL (ref 13.5–17.5)
LYMPHOCYTES # BLD: 5.7 K/UL (ref 1–5.1)
LYMPHOCYTES NFR BLD: 49 %
MCH RBC QN AUTO: 32.8 PG (ref 26–34)
MCHC RBC AUTO-ENTMCNC: 34.4 G/DL (ref 31–36)
MCV RBC AUTO: 95.4 FL (ref 80–100)
MONOCYTES # BLD: 0.7 K/UL (ref 0–1.3)
MONOCYTES NFR BLD: 6 %
NEUTROPHILS # BLD: 4.9 K/UL (ref 1.7–7.7)
NEUTROPHILS NFR BLD: 41 %
NEUTS BAND NFR BLD MANUAL: 1 % (ref 0–7)
PATH INTERP BLD-IMP: YES
PERFORMED ON: ABNORMAL
PLATELET # BLD AUTO: 153 K/UL (ref 135–450)
PLATELET BLD QL SMEAR: ADEQUATE
PMV BLD AUTO: 9.9 FL (ref 5–10.5)
POIKILOCYTOSIS BLD QL SMEAR: ABNORMAL
POTASSIUM SERPL-SCNC: 3.6 MMOL/L (ref 3.5–5.1)
PROT SERPL-MCNC: 7.2 G/DL (ref 6.4–8.2)
RBC # BLD AUTO: 4.25 M/UL (ref 4.2–5.9)
SLIDE REVIEW: ABNORMAL
SODIUM SERPL-SCNC: 135 MMOL/L (ref 136–145)
TSH SERPL DL<=0.005 MIU/L-ACNC: 4.86 UIU/ML (ref 0.27–4.2)
WBC # BLD AUTO: 11.7 K/UL (ref 4–11)

## 2024-11-12 PROCEDURE — 99285 EMERGENCY DEPT VISIT HI MDM: CPT

## 2024-11-12 PROCEDURE — 6360000002 HC RX W HCPCS: Performed by: EMERGENCY MEDICINE

## 2024-11-12 PROCEDURE — 84443 ASSAY THYROID STIM HORMONE: CPT

## 2024-11-12 PROCEDURE — 6370000000 HC RX 637 (ALT 250 FOR IP): Performed by: PSYCHIATRY & NEUROLOGY

## 2024-11-12 PROCEDURE — 36415 COLL VENOUS BLD VENIPUNCTURE: CPT

## 2024-11-12 PROCEDURE — 96372 THER/PROPH/DIAG INJ SC/IM: CPT

## 2024-11-12 PROCEDURE — 90791 PSYCH DIAGNOSTIC EVALUATION: CPT | Performed by: SOCIAL WORKER

## 2024-11-12 PROCEDURE — 80053 COMPREHEN METABOLIC PANEL: CPT

## 2024-11-12 PROCEDURE — 82140 ASSAY OF AMMONIA: CPT

## 2024-11-12 PROCEDURE — 93005 ELECTROCARDIOGRAM TRACING: CPT | Performed by: EMERGENCY MEDICINE

## 2024-11-12 PROCEDURE — 6370000000 HC RX 637 (ALT 250 FOR IP): Performed by: EMERGENCY MEDICINE

## 2024-11-12 PROCEDURE — 93010 ELECTROCARDIOGRAM REPORT: CPT | Performed by: INTERNAL MEDICINE

## 2024-11-12 PROCEDURE — 85025 COMPLETE CBC W/AUTO DIFF WBC: CPT

## 2024-11-12 PROCEDURE — 83036 HEMOGLOBIN GLYCOSYLATED A1C: CPT

## 2024-11-12 PROCEDURE — 70450 CT HEAD/BRAIN W/O DYE: CPT

## 2024-11-12 PROCEDURE — 80061 LIPID PANEL: CPT

## 2024-11-12 PROCEDURE — 1240000000 HC EMOTIONAL WELLNESS R&B

## 2024-11-12 RX ORDER — OLANZAPINE 10 MG/2ML
5 INJECTION, POWDER, FOR SOLUTION INTRAMUSCULAR
Status: COMPLETED | OUTPATIENT
Start: 2024-11-12 | End: 2024-11-12

## 2024-11-12 RX ORDER — ACETAMINOPHEN 325 MG/10.15ML
1000 LIQUID ORAL 2 TIMES DAILY
Status: ON HOLD | COMMUNITY
End: 2024-11-12

## 2024-11-12 RX ORDER — MECOBALAMIN 5000 MCG
5 TABLET,DISINTEGRATING ORAL NIGHTLY
Status: DISCONTINUED | OUTPATIENT
Start: 2024-11-12 | End: 2024-11-12

## 2024-11-12 RX ORDER — MAGNESIUM HYDROXIDE/ALUMINUM HYDROXICE/SIMETHICONE 120; 1200; 1200 MG/30ML; MG/30ML; MG/30ML
30 SUSPENSION ORAL EVERY 6 HOURS PRN
Status: DISCONTINUED | OUTPATIENT
Start: 2024-11-12 | End: 2024-11-15 | Stop reason: HOSPADM

## 2024-11-12 RX ORDER — LACTOBACILLUS RHAMNOSUS GG 10B CELL
1 CAPSULE ORAL 2 TIMES DAILY
Status: DISCONTINUED | OUTPATIENT
Start: 2024-11-12 | End: 2024-11-15 | Stop reason: HOSPADM

## 2024-11-12 RX ORDER — MIRTAZAPINE 15 MG/1
7.5 TABLET, FILM COATED ORAL NIGHTLY
Status: DISCONTINUED | OUTPATIENT
Start: 2024-11-12 | End: 2024-11-13

## 2024-11-12 RX ORDER — HYDROXYZINE HYDROCHLORIDE 50 MG/1
50 TABLET, FILM COATED ORAL 3 TIMES DAILY PRN
Status: DISCONTINUED | OUTPATIENT
Start: 2024-11-12 | End: 2024-11-13

## 2024-11-12 RX ORDER — NICOTINE 21 MG/24HR
1 PATCH, TRANSDERMAL 24 HOURS TRANSDERMAL DAILY
Status: DISCONTINUED | OUTPATIENT
Start: 2024-11-12 | End: 2024-11-12

## 2024-11-12 RX ORDER — TRAZODONE HYDROCHLORIDE 50 MG/1
50 TABLET, FILM COATED ORAL NIGHTLY PRN
Status: DISCONTINUED | OUTPATIENT
Start: 2024-11-12 | End: 2024-11-12

## 2024-11-12 RX ORDER — ALPRAZOLAM 0.25 MG/1
0.25 TABLET ORAL DAILY
Status: DISCONTINUED | OUTPATIENT
Start: 2024-11-12 | End: 2024-11-15 | Stop reason: HOSPADM

## 2024-11-12 RX ORDER — MEMANTINE HYDROCHLORIDE 5 MG/1
10 TABLET ORAL DAILY
Status: DISCONTINUED | OUTPATIENT
Start: 2024-11-12 | End: 2024-11-15 | Stop reason: HOSPADM

## 2024-11-12 RX ORDER — OLANZAPINE 5 MG/1
5 TABLET, ORALLY DISINTEGRATING ORAL ONCE
Status: COMPLETED | OUTPATIENT
Start: 2024-11-12 | End: 2024-11-12

## 2024-11-12 RX ORDER — LIDOCAINE 4 G/G
1 PATCH TOPICAL DAILY
Status: DISCONTINUED | OUTPATIENT
Start: 2024-11-12 | End: 2024-11-15 | Stop reason: HOSPADM

## 2024-11-12 RX ORDER — CEPHALEXIN 500 MG/1
500 CAPSULE ORAL 3 TIMES DAILY
Qty: 21 CAPSULE | Refills: 0 | Status: SHIPPED | OUTPATIENT
Start: 2024-11-12 | End: 2024-11-15 | Stop reason: HOSPADM

## 2024-11-12 RX ORDER — CEPHALEXIN 500 MG/1
500 CAPSULE ORAL ONCE
Status: COMPLETED | OUTPATIENT
Start: 2024-11-12 | End: 2024-11-12

## 2024-11-12 RX ORDER — BENZTROPINE MESYLATE 1 MG/ML
2 INJECTION, SOLUTION INTRAMUSCULAR; INTRAVENOUS 2 TIMES DAILY PRN
Status: DISCONTINUED | OUTPATIENT
Start: 2024-11-12 | End: 2024-11-13

## 2024-11-12 RX ORDER — RISPERIDONE 0.25 MG/1
0.5 TABLET ORAL 2 TIMES DAILY
Status: DISCONTINUED | OUTPATIENT
Start: 2024-11-12 | End: 2024-11-15 | Stop reason: HOSPADM

## 2024-11-12 RX ORDER — LIDOCAINE 50 MG/G
1 PATCH TOPICAL DAILY
COMMUNITY

## 2024-11-12 RX ORDER — MIRTAZAPINE 7.5 MG/1
7.5 TABLET, FILM COATED ORAL NIGHTLY
COMMUNITY

## 2024-11-12 RX ORDER — PANTOPRAZOLE SODIUM 40 MG/1
40 TABLET, DELAYED RELEASE ORAL
Status: DISCONTINUED | OUTPATIENT
Start: 2024-11-13 | End: 2024-11-13

## 2024-11-12 RX ORDER — OLANZAPINE 5 MG/1
5 TABLET ORAL EVERY 8 HOURS PRN
Status: DISCONTINUED | OUTPATIENT
Start: 2024-11-12 | End: 2024-11-13

## 2024-11-12 RX ORDER — PERPHENAZINE 4 MG/1
4 TABLET ORAL NIGHTLY
Status: DISCONTINUED | OUTPATIENT
Start: 2024-11-12 | End: 2024-11-12

## 2024-11-12 RX ORDER — IBUPROFEN 400 MG/1
400 TABLET, FILM COATED ORAL EVERY 6 HOURS PRN
Status: DISCONTINUED | OUTPATIENT
Start: 2024-11-12 | End: 2024-11-12

## 2024-11-12 RX ORDER — ACETAMINOPHEN 325 MG/1
650 TABLET ORAL EVERY 4 HOURS PRN
Status: DISCONTINUED | OUTPATIENT
Start: 2024-11-12 | End: 2024-11-15 | Stop reason: HOSPADM

## 2024-11-12 RX ORDER — POLYETHYLENE GLYCOL 3350 17 G
2 POWDER IN PACKET (EA) ORAL
Status: DISCONTINUED | OUTPATIENT
Start: 2024-11-12 | End: 2024-11-12

## 2024-11-12 RX ORDER — ALPRAZOLAM 0.25 MG/1
0.25 TABLET ORAL DAILY
COMMUNITY

## 2024-11-12 RX ORDER — MECOBALAMIN 5000 MCG
5 TABLET,DISINTEGRATING ORAL NIGHTLY PRN
Status: DISCONTINUED | OUTPATIENT
Start: 2024-11-12 | End: 2024-11-12

## 2024-11-12 RX ORDER — HYDRALAZINE HYDROCHLORIDE 25 MG/1
25 TABLET, FILM COATED ORAL EVERY 8 HOURS PRN
Status: ON HOLD | COMMUNITY
End: 2024-11-12

## 2024-11-12 RX ADMIN — ALPRAZOLAM 0.25 MG: 0.25 TABLET ORAL at 17:56

## 2024-11-12 RX ADMIN — Medication 1 CAPSULE: at 17:56

## 2024-11-12 RX ADMIN — MIRTAZAPINE 7.5 MG: 15 TABLET, FILM COATED ORAL at 20:22

## 2024-11-12 RX ADMIN — MEMANTINE HYDROCHLORIDE 10 MG: 5 TABLET ORAL at 17:56

## 2024-11-12 RX ADMIN — DICLOFENAC SODIUM 2 G: 10 GEL TOPICAL at 17:54

## 2024-11-12 RX ADMIN — OLANZAPINE 5 MG: 5 TABLET, ORALLY DISINTEGRATING ORAL at 13:24

## 2024-11-12 RX ADMIN — DICLOFENAC SODIUM 2 G: 10 GEL TOPICAL at 20:22

## 2024-11-12 RX ADMIN — OLANZAPINE 5 MG: 10 INJECTION, POWDER, FOR SOLUTION INTRAMUSCULAR at 07:15

## 2024-11-12 RX ADMIN — RISPERIDONE 0.5 MG: 0.25 TABLET, FILM COATED ORAL at 20:22

## 2024-11-12 RX ADMIN — METFORMIN HYDROCHLORIDE 500 MG: 500 TABLET ORAL at 17:56

## 2024-11-12 RX ADMIN — CEPHALEXIN 500 MG: 500 CAPSULE ORAL at 05:33

## 2024-11-12 ASSESSMENT — PAIN DESCRIPTION - ORIENTATION: ORIENTATION: RIGHT

## 2024-11-12 ASSESSMENT — SLEEP AND FATIGUE QUESTIONNAIRES
DO YOU USE A SLEEP AID: COMMENT
DO YOU HAVE DIFFICULTY SLEEPING: COMMENT

## 2024-11-12 ASSESSMENT — PAIN - FUNCTIONAL ASSESSMENT: PAIN_FUNCTIONAL_ASSESSMENT: ACTIVITIES ARE NOT PREVENTED

## 2024-11-12 ASSESSMENT — PAIN DESCRIPTION - LOCATION: LOCATION: LEG

## 2024-11-12 ASSESSMENT — LIFESTYLE VARIABLES
HOW OFTEN DO YOU HAVE A DRINK CONTAINING ALCOHOL: NEVER
HOW OFTEN DO YOU HAVE A DRINK CONTAINING ALCOHOL: NEVER
HOW MANY STANDARD DRINKS CONTAINING ALCOHOL DO YOU HAVE ON A TYPICAL DAY: PATIENT DOES NOT DRINK
HOW MANY STANDARD DRINKS CONTAINING ALCOHOL DO YOU HAVE ON A TYPICAL DAY: PATIENT DOES NOT DRINK

## 2024-11-12 ASSESSMENT — PAIN SCALES - GENERAL
PAINLEVEL_OUTOF10: 2
PAINLEVEL_OUTOF10: 0
PAINLEVEL_OUTOF10: 4

## 2024-11-12 ASSESSMENT — PAIN DESCRIPTION - DESCRIPTORS: DESCRIPTORS: OTHER (COMMENT)

## 2024-11-12 NOTE — VIRTUAL HEALTH
Adonis Ordonez  [id number]  1950     Social Work Behavioral Health Crisis Assessment    11/12/24    Chief Complaint: psychosis     HPI: Patient is a 74 y.o. White (non-) male who presents for aggression . Patient presented to the ED on 11/12/24 from home.    Past Psychiatric History:  Previous Diagnoses/symptoms: Denies  Previous suicide attempts/self-harm: Denies  Inpatient psychiatric hospitalizations: no  Current outpatient psychiatric provider: Denies  Current therapist: States not in therapy  Previous psychiatric medication trials: No prior medication trials  Current psychiatric medications: No current psychiatric medications  Family Psychiatric History: Denies    Sleep Hours: 8    Sleep concerns: denies    Use of sleep medications: denies    Substance Abuse History:  Tobacco: Denies  Alcohol: Denies  Marijuana: Denies  Stimulant: Denies  Opiates: Denies  Benzodiazepine: Denies  Other illicit drug usage: Denies  History of substance/alcohol abuse treatment: Denies    Social History:  Education: H.S.  Living Situation/Interest: nursing home  Marital/Committed relationship and parenting hx:   Occupation: Unemployed  Legal History/Hx of Violence: Denies  Spiritual History: Denies  Psychological trauma, neglect, or abuse: denies hx of trauma/abuse   Access to guns or other weapons: denies having access to firearms/dangerous weapons     Past Medical History:  Active Ambulatory Problems     Diagnosis Date Noted    Dementia, unspecified dementia severity, unspecified dementia type, unspecified whether behavioral, psychotic, or mood disturbance or anxiety ([facility]) 03/20/2024    Dementia with behavioral disturbance ([facility]) 03/20/2024    Severe vascular dementia with agitation ([facility]) 03/21/2024    Endocarditis 03/21/2024    Mitral valve insufficiency 03/21/2024    Type 2 diabetes mellitus without complication, with long-term current use of insulin ([facility]) 03/21/2024     Resolved Ambulatory Problems        metFORMIN (GLUCOPHAGE) 500 MG tablet Take 1 tablet by mouth 2 times daily (with meals)    Provider, MD Faustino        Labs:  UDS: not available  ETOH: not available        Mental Status Exam:  Level of consciousness:  Moderate dysattention (reduced clarity of awareness with impaired ability to focus, sustain, or shift attention)   Appearance:  ill-appearing.  Does appear stated age. No acute distress.  Behavior/Motor:  no abnormalities noted  Attitude toward examiner:  withdrawn  SI/HI:Denies SI/HI  Speech:  normal rate and normal volume , Tone: normal tone  Mood: irritable  Affect: mood congruent and mood incongruent  Thought Processes:  linear.   Thought Content: No delusions or other perceptual abnormalities  Hallucinations:  Hallucinations: Denies AVOT-H  Cognition:  disoriented   Concentration: poor  Memory: impaired recent memory, though not formally tested.  Insight: poor   Judgement: poor   Fund of Knowledge: limited    Overall Level Suicide Risk: TelePsych CSSRS Risk Level: Low Risk  CSSR-S Screening: Patient reported no Sucidal ideation.     Brief ClinicalSummary:   Patient is a 74 y.o.  male who presents for aggression.      Per chart review, This is a 74-year-old male, presenting for evaluation of possible aggressive behavior, altered mental status.  He was sent in from his nursing facility.  Due to this change, patient was brought back to the emergency department room and he required restraining due to his aggression to staff.     Writer talked to the patient, at the start of the assessment, patent could not identify his birth date, and thought he was at home. When starting talking to the patient, patient said \"let me slap her three times\", patient was disoriented and had difficulties engaging and talking to the writer.     Collateral:     Son Rashawn [phone] reported that patient has been living at  [facility] for a week (started a new facility, was at a different location)

## 2024-11-12 NOTE — ED NOTES
Update to son, Rashawn per phone who states that the new NH in [place] took him off him anti-psychotic meds and patient flipped out after that. Son informed that patient is in restraints. /Pauline Craft RN

## 2024-11-12 NOTE — ED NOTES
Patient remains awake and alert, confused, in 4-point restraints. Skin check completed, No signs of redness or injury/candy cobb RN

## 2024-11-12 NOTE — ED NOTES
Violent Restraint Face-to-Face Evaluation  (must be completed within one hour of initiation of restraints)      Evaluate immediate situation:  pt continues to scream and hit toward staff    Reaction to intervention: pt continues to hit toward staff    Medical Condition/Assessment: stable at this time    Behavioral Condition/Assessment: pt angry; aggressive and confused     The patient’s review of systems, history, medications, and recent labs were reviewed at this time.     Continue/Discontinue restraints at this time: Continue    One-Hour Review Evaluation Physician Notification:    Provider Notified (Name):  Myron      Date  11/12/24     Time  1017         Physician or Designated One-Hour Reviewer  Claudette Garcia RN
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
## 2024-11-12 NOTE — BH NOTE
Transfer Center Handoff for Behavioral Health Transfers      Patient's Current Location: [facility] ED     Chief Complaint   Patient presents with    Altered Mental Status     Pt brought in by squad with alleged UTI, pt disoriented X4       Current or History of Violent Behavior: Yes    Currently in Restraints Now or During this Encounter: Yes  (Specify if Agitation or self harm is noted in ED?)  If yes, please describe behaviors requiring restraint:        Hard/2 point Alternating Limbs    Medical Clearance Documented and Verified in the Chart: Yes    No Known Allergies     Can Patient Tolerate Lying Flat: Yes    Able to Perform ADLs:  Yes  (Specify if able to ambulate or uses any mobility devices such as cane or walker)  Activity:    Level of Assistance:    Assistive Device:    Miscellaneous Devices:      LABS    CBC:   Lab Results   Component Value Date/Time    WBC 11.7 11/12/2024 07:35 AM    RBC 4.25 11/12/2024 07:35 AM    HGB 13.9 11/12/2024 07:35 AM    HCT 40.5 11/12/2024 07:35 AM    MCV 95.4 11/12/2024 07:35 AM    MCH 32.8 11/12/2024 07:35 AM    MCHC 34.4 11/12/2024 07:35 AM    RDW 14.4 11/12/2024 07:35 AM     11/12/2024 07:35 AM    MPV 9.9 11/12/2024 07:35 AM     CMP:   Lab Results   Component Value Date/Time     11/12/2024 07:34 AM    K 3.6 11/12/2024 07:34 AM    CL 96 11/12/2024 07:34 AM    CO2 19 11/12/2024 07:34 AM    BUN 18 11/12/2024 07:34 AM    CREATININE 1.0 11/12/2024 07:34 AM    AGRATIO 1.0 11/12/2024 07:34 AM    LABGLOM 78 11/12/2024 07:34 AM    LABGLOM >60 03/20/2024 10:35 AM    GLUCOSE 143 11/12/2024 07:34 AM    CALCIUM 8.2 11/12/2024 07:34 AM    BILITOT 0.4 11/12/2024 07:34 AM    ALKPHOS 109 11/12/2024 07:34 AM    AST 40 11/12/2024 07:34 AM    ALT 10 11/12/2024 07:34 AM     Drug Panel: No results found for: \"AMPHETAMUR\", \"BARBITURATUR\", \"COCAINEUR\", \"METHADU\", \"OPIAU\", \"THCUR\", \"LABCOMM\"  UA:  Lab Results   Component Value Date/Time    COLIN Gomez 03/20/2024 12:46 PM     PROTEINU Negative 03/20/2024 12:46 PM    GLUCOSEU 500 03/20/2024 12:46 PM    KETUA Negative 03/20/2024 12:46 PM    BILIRUBINUR Negative 03/20/2024 12:46 PM    BLOODU Negative 03/20/2024 12:46 PM    UROBILINOGEN 0.2 03/20/2024 12:46 PM    NITRU Negative 03/20/2024 12:46 PM    LEUKOCYTESUR Negative 03/20/2024 12:46 PM     PREGNANCY TEST: No results found for: \"PREGTESTUR\"    Dialysis: No    Target Destination: Psych facility      Insurance: Payor: MEDICARE /  /  /      Current Psychotic Symptoms  Harmful Actions Towards Others:    Orientation Level:    Speech Pattern:    General Attitude:    Emotions:    Delusions:    Hallucination:       Any Suicidal Ideations: No Risk    Homicidal Ideations/Violence Risk:   Observed Violent Behavior:    Homicidal Ideations:      Past Psychiatric History:       Diagnosis Date    AD (Alzheimer's disease) (HCC)     Chronic pancreatitis (HCC)     Depressive disorder     Diabetes mellitus (HCC)     Endocarditis     Gastroesophageal reflux     Generalized anxiety disorder     Hypertension     Mitral valve insufficiency        Hold (TDO/Involuntary Hold): Yes    The patient is currently receiving care for the above psychiatric illness.     Home Medications  Does Patient Have Medications to Bring to the Facility: No  Medications Prior to Admission:   Prior to Admission Medications   Prescriptions Last Dose Informant Patient Reported? Taking?   diclofenac sodium (VOLTAREN) 1 % GEL   No No   Sig: Apply 2 g topically 2 times daily   lactobacillus (CULTURELLE) capsule   No No   Sig: Take 1 capsule by mouth 2 times daily at 0800 and 1400   melatonin 5 MG TBDP disintegrating tablet   No No   Sig: Take 1 tablet by mouth nightly   memantine (NAMENDA) 10 MG tablet   No No   Sig: Take 1 tablet by mouth daily   metFORMIN (GLUCOPHAGE) 500 MG tablet   Yes No   Sig: Take 1 tablet by mouth 2 times daily (with meals)   perphenazine 4 MG tablet   No No   Sig: Take 1 tablet by mouth nightly

## 2024-11-12 NOTE — ED NOTES
4-point restraints to 2-point. Patient remains awake, fidgety, confused conversation. Sitter remains at bedside/candy cobb RN

## 2024-11-12 NOTE — ED NOTES
Writer spoke with Sloane from [facility], patient is reported to have been physical with hospice staff as well and is agreeable on admission. Patient to be discharged from hospice and will readmit upon admission. Questions for hospice should be directed to Sloane [phone]

## 2024-11-12 NOTE — BH NOTE
`Behavioral Health [place]  Admission Note     Admission Type:   Admission Type: Voluntary    Reason for admission:  Reason for Admission: Dementia with behavorial disturbance    PATIENT STRENGTHS:       Patient Strengths and Limitations:       Addictive Behavior:   Addictive Behavior  In the Past 3 Months, Have You Felt or Has Someone Told You That You Have a Problem With  : Other (comment) (TRAVON)    Medical Problems:   Past Medical History:   Diagnosis Date    AD (Alzheimer's disease) (HCC)     Chronic pancreatitis (HCC)     Depressive disorder     Diabetes mellitus (HCC)     Endocarditis     Gastroesophageal reflux     Generalized anxiety disorder     Hypertension     Mitral valve insufficiency        Status EXAM:  Mental Status and Behavioral Exam  Normal: No  Level of Assistance: Set up  Facial Expression: Brightened  Affect: Constricted  Level of Consciousness: Confused  Frequency of Checks: 4 times per hour, close  Mood:Normal: No  Mood: Helpless, Sad  Motor Activity:Normal: Yes  Eye Contact: Good  Observed Behavior: Cooperative, Friendly  Sexual Misconduct History: Current - no  Preception: Clyde to person, Clyde to time, Clyde to place, Clyde to situation  Attention:Normal: No  Attention: Distractible, Unable to concentrate  Thought Processes: Flight of ideas  Thought Content:Normal: No  Thought Content: Poverty of content  Depression Symptoms: No problems reported or observed.  Anxiety Symptoms: Generalized  Naomi Symptoms: Poor judgment  Hallucinations: Unable to assess (No RTIS noted)  Delusions:  (TRAVON)  Memory:Normal: No  Memory: Poor recent, Poor remote  Insight and Judgment: No  Insight and Judgment: Poor judgment, Poor insight    Tobacco Screening:  Practical Counseling, on admission, karen X, if applicable and completed (first 3 are required if patient doesn't refuse):            ( )  Recognizing danger situations (included triggers and roadblocks)                    ( )  Coping skills (new

## 2024-11-12 NOTE — ED PROVIDER NOTES
Patient was signed out to me by the outgoing physician at 0700. Please see their note for the initial H&P, physical exam, medical decision making and management. Patient signed out pending laboratory evaluation, psychiatric evaluation    Brief HPI:    This is a 74-year-old male, presenting for evaluation of possible aggressive behavior, altered mental status.  He was sent in from his nursing facility.  There is concern about urinary tract infection.  Patient was not able to provide a urine sample so he was given Keflex for presumptive UTI    When patient was being loaded on to stretcher to go back home, patient became belligerent, aggressive and swinging at staff.  Due to this change, patient was brought back to the emergency department room and he required restraining due to his aggression to staff.  He was given IM Zyprexa for agitation.  Will obtain laboratory evaluation, urinalysis and psychiatric evaluation    EKG  The Ekg interpreted by myself in the emergency department in the absence of a cardiologist.  sinus tachycardia, npwz=443  with a rate of 112  Axis is   Normal  QTc is  within an acceptable range  Intervals and Durations are unremarkable.      No specific ST-T wave changes appreciated.  No evidence of acute ischemia.   No significant change from prior EKG dated 3/21/2024      ED COURSE/MDM  Patient seen and evaluated.     Laboratory evaluation obtained secondary to aggressive behavior.  Laboratory evaluation is overall unremarkable, renal function appears to be at baseline TSH is not significantly abnormal.  CT head is without acute intracranial process.  Patient was eval by psychiatry.  They do recommend psychiatric admission for geriatric psychiatry evaluation and management.  Transfer order has been placed.  Patient is medically clear and appropriate inpatient psychiatric care at this time    Patient was given scripts for the following medications. I counseled patient how to take these medications.

## 2024-11-12 NOTE — BH NOTE
4 Eyes Skin Assessment     The patient is being assessed for  Admission    I agree that 2 RN's have performed a thorough Head to Toe Skin Assessment on the patient. ALL assessment sites listed below have been assessed.       Areas assessed for pressure by both nurses: Yes  [x]   Head, Face, and Ears   [x]   Shoulders, Back, and Chest  [x]   Arms, Elbows, and Hands   [x]   Coccyx, Sacrum, and Ischum  [x]   Legs, Feet, and Heels                                Skin Assessed Under all Medical Devices by both nurses:  N/A               All Mepilex Borders were peeled back and area peeked at by both nurses:  No: N/A  Please list where Mepilex Borders are located:  N/A                 Does the Patient have Skin Breakdown related to pressure?  No     (Insert Photo here N/A)         Dino Prevention initiated:  No   Wound Care Orders initiated:  No      [facility] nurse consulted for Pressure Injury (Stage 3,4, Unstageable, DTI, NWPT, Complex wounds)and New or Established Ostomies:  No  Air cushion added to chair as pt sits for long periods and has blanchable redness to lower buttocks. He is thin built      Nurse 1 eSignature: Electronically signed by Reema Silveira RN on 11/12/24 at 6:42 PM EST    **SHARE this note so that the co-signing nurse is able to place an eSignature**    Nurse 2 eSignature: {Esignature:[id number]}

## 2024-11-13 LAB
ALBUMIN SERPL-MCNC: 2.9 G/DL (ref 3.4–5)
ALBUMIN/GLOB SERPL: 0.8 {RATIO} (ref 1.1–2.2)
ALP SERPL-CCNC: 94 U/L (ref 40–129)
ALT SERPL-CCNC: 7 U/L (ref 10–40)
ANION GAP SERPL CALCULATED.3IONS-SCNC: 13 MMOL/L (ref 3–16)
AST SERPL-CCNC: 40 U/L (ref 15–37)
BASOPHILS # BLD: 0.1 K/UL (ref 0–0.2)
BASOPHILS NFR BLD: 0.8 %
BILIRUB SERPL-MCNC: 0.6 MG/DL (ref 0–1)
BUN SERPL-MCNC: 10 MG/DL (ref 7–20)
CALCIUM SERPL-MCNC: 8 MG/DL (ref 8.3–10.6)
CHLORIDE SERPL-SCNC: 103 MMOL/L (ref 99–110)
CHOLEST SERPL-MCNC: 250 MG/DL (ref 0–199)
CO2 SERPL-SCNC: 18 MMOL/L (ref 21–32)
CREAT SERPL-MCNC: 0.9 MG/DL (ref 0.8–1.3)
DEPRECATED RDW RBC AUTO: 14.6 % (ref 12.4–15.4)
EKG ATRIAL RATE: 89 BPM
EKG DIAGNOSIS: NORMAL
EKG P AXIS: 82 DEGREES
EKG P-R INTERVAL: 152 MS
EKG Q-T INTERVAL: 346 MS
EKG QRS DURATION: 68 MS
EKG QTC CALCULATION (BAZETT): 420 MS
EKG R AXIS: 26 DEGREES
EKG T AXIS: 31 DEGREES
EKG VENTRICULAR RATE: 89 BPM
EOSINOPHIL # BLD: 0.1 K/UL (ref 0–0.6)
EOSINOPHIL NFR BLD: 1.4 %
EST. AVERAGE GLUCOSE BLD GHB EST-MCNC: 180 MG/DL
GFR SERPLBLD CREATININE-BSD FMLA CKD-EPI: 89 ML/MIN/{1.73_M2}
GLUCOSE SERPL-MCNC: 114 MG/DL (ref 70–99)
HBA1C MFR BLD: 7.9 %
HCT VFR BLD AUTO: 39.1 % (ref 40.5–52.5)
HDLC SERPL-MCNC: 48 MG/DL (ref 40–60)
HGB BLD-MCNC: 13.2 G/DL (ref 13.5–17.5)
LDLC SERPL CALC-MCNC: 171 MG/DL
LYMPHOCYTES # BLD: 2.2 K/UL (ref 1–5.1)
LYMPHOCYTES NFR BLD: 21.5 %
MCH RBC QN AUTO: 32.2 PG (ref 26–34)
MCHC RBC AUTO-ENTMCNC: 33.9 G/DL (ref 31–36)
MCV RBC AUTO: 95 FL (ref 80–100)
MONOCYTES # BLD: 0.8 K/UL (ref 0–1.3)
MONOCYTES NFR BLD: 7.7 %
NEUTROPHILS # BLD: 6.9 K/UL (ref 1.7–7.7)
NEUTROPHILS NFR BLD: 68.6 %
PLATELET # BLD AUTO: 144 K/UL (ref 135–450)
PMV BLD AUTO: 9.2 FL (ref 5–10.5)
POTASSIUM SERPL-SCNC: 3.9 MMOL/L (ref 3.5–5.1)
PROT SERPL-MCNC: 6.5 G/DL (ref 6.4–8.2)
RBC # BLD AUTO: 4.12 M/UL (ref 4.2–5.9)
SODIUM SERPL-SCNC: 134 MMOL/L (ref 136–145)
TRIGL SERPL-MCNC: 157 MG/DL (ref 0–150)
TROPONIN, HIGH SENSITIVITY: 22 NG/L (ref 0–22)
VLDLC SERPL CALC-MCNC: 31 MG/DL
WBC # BLD AUTO: 10 K/UL (ref 4–11)

## 2024-11-13 PROCEDURE — 85025 COMPLETE CBC W/AUTO DIFF WBC: CPT

## 2024-11-13 PROCEDURE — 84484 ASSAY OF TROPONIN QUANT: CPT

## 2024-11-13 PROCEDURE — 6370000000 HC RX 637 (ALT 250 FOR IP): Performed by: PSYCHIATRY & NEUROLOGY

## 2024-11-13 PROCEDURE — 93005 ELECTROCARDIOGRAM TRACING: CPT | Performed by: NURSE PRACTITIONER

## 2024-11-13 PROCEDURE — 99223 1ST HOSP IP/OBS HIGH 75: CPT | Performed by: PSYCHIATRY & NEUROLOGY

## 2024-11-13 PROCEDURE — 93010 ELECTROCARDIOGRAM REPORT: CPT | Performed by: INTERNAL MEDICINE

## 2024-11-13 PROCEDURE — 1240000000 HC EMOTIONAL WELLNESS R&B

## 2024-11-13 PROCEDURE — 36415 COLL VENOUS BLD VENIPUNCTURE: CPT

## 2024-11-13 PROCEDURE — 80053 COMPREHEN METABOLIC PANEL: CPT

## 2024-11-13 RX ORDER — LANSOPRAZOLE 30 MG/1
30 TABLET, ORALLY DISINTEGRATING, DELAYED RELEASE ORAL
Status: DISCONTINUED | OUTPATIENT
Start: 2024-11-13 | End: 2024-11-15 | Stop reason: HOSPADM

## 2024-11-13 RX ORDER — MIRTAZAPINE 15 MG/1
15 TABLET, FILM COATED ORAL NIGHTLY
Status: DISCONTINUED | OUTPATIENT
Start: 2024-11-13 | End: 2024-11-15 | Stop reason: HOSPADM

## 2024-11-13 RX ORDER — MECOBALAMIN 5000 MCG
5 TABLET,DISINTEGRATING ORAL EVERY EVENING
Status: DISCONTINUED | OUTPATIENT
Start: 2024-11-13 | End: 2024-11-15 | Stop reason: HOSPADM

## 2024-11-13 RX ORDER — OLANZAPINE 5 MG/1
2.5 TABLET ORAL 3 TIMES DAILY PRN
Status: DISCONTINUED | OUTPATIENT
Start: 2024-11-13 | End: 2024-11-15 | Stop reason: HOSPADM

## 2024-11-13 RX ADMIN — RISPERIDONE 0.5 MG: 0.25 TABLET, FILM COATED ORAL at 14:13

## 2024-11-13 RX ADMIN — METFORMIN HYDROCHLORIDE 500 MG: 500 TABLET ORAL at 17:21

## 2024-11-13 RX ADMIN — Medication 1 CAPSULE: at 14:14

## 2024-11-13 RX ADMIN — Medication 5 MG: at 17:21

## 2024-11-13 RX ADMIN — ALPRAZOLAM 0.25 MG: 0.25 TABLET ORAL at 14:12

## 2024-11-13 RX ADMIN — MEMANTINE HYDROCHLORIDE 10 MG: 5 TABLET ORAL at 14:15

## 2024-11-13 RX ADMIN — HYDROXYZINE HYDROCHLORIDE 50 MG: 50 TABLET, FILM COATED ORAL at 00:23

## 2024-11-13 RX ADMIN — RISPERIDONE 0.5 MG: 0.25 TABLET, FILM COATED ORAL at 20:12

## 2024-11-13 RX ADMIN — ACETAMINOPHEN 650 MG: 325 TABLET ORAL at 14:11

## 2024-11-13 RX ADMIN — MIRTAZAPINE 15 MG: 15 TABLET, FILM COATED ORAL at 20:12

## 2024-11-13 ASSESSMENT — PAIN SCALES - GENERAL
PAINLEVEL_OUTOF10: 3
PAINLEVEL_OUTOF10: 5
PAINLEVEL_OUTOF10: 0

## 2024-11-13 ASSESSMENT — PAIN DESCRIPTION - ORIENTATION: ORIENTATION: OTHER (COMMENT)

## 2024-11-13 ASSESSMENT — PAIN DESCRIPTION - LOCATION: LOCATION: HEAD

## 2024-11-13 ASSESSMENT — PAIN - FUNCTIONAL ASSESSMENT: PAIN_FUNCTIONAL_ASSESSMENT: ACTIVITIES ARE NOT PREVENTED

## 2024-11-13 ASSESSMENT — PAIN DESCRIPTION - DESCRIPTORS: DESCRIPTORS: ACHING

## 2024-11-13 NOTE — BH NOTE
Patient resting in bed. Patient refused medications at this time reporting that he wants to sleep a bit longer. Compliant with vitals

## 2024-11-13 NOTE — PROGRESS NOTES
0130, checked on pt appears asleep, no signs of distress noted. PRN atarax 50mg given appears to be effective.

## 2024-11-13 NOTE — PLAN OF CARE
Problem: Safety - Violent/Self-destructive Restraint  Goal: Remains free of injury from restraints (Restraint for Violent/Self-Destructive Behavior)  Description: INTERVENTIONS:  1. Determine that de-escalation and other, less restrictive measures have been tried or would not be effective before applying the restraint  2. Identify and document the criteria for restraint  3. Evaluate the patient's condition at the time of restraint application  4. Inform patient/family regarding the reason for restraint/seclusion  5. Q2H: Monitor comfort, nutrition and hydration needs  6. Q15M: Perform safety checks including skin, circulation, sensory, respiratory and psychological status  7. Ensure continuous observation  8. Identify and implement measures to help patient regain control, assess readiness for release and initiate progressive release per policy  Outcome: Progressing     Problem: Safety - Medical Restraint  Goal: Remains free of injury from restraints (Restraint for Interference with Medical Device)  Description: INTERVENTIONS:  1. Determine that other, less restrictive measures have been tried or would not be effective before applying the restraint  2. Evaluate the patient's condition at the time of restraint application  3. Inform patient/family regarding the reason for restraint  4. Q2H: Monitor safety, psychosocial status, comfort, nutrition and hydration  Outcome: Progressing     Problem: Pain  Goal: Verbalizes/displays adequate comfort level or baseline comfort level  Outcome: Progressing

## 2024-11-13 NOTE — PROGRESS NOTES
0023, awake at this time, appears restless in his room, confused & pacing, difficult to redirect, Atarax 50mg given as PRN, to continue to monitor pt at this time,

## 2024-11-13 NOTE — BH NOTE
Talked with Valeria at [facility]. Valeria reports that patient is on Hospice care. But the family did not make the decision yet.Valeria also reports that the patient does not have a DPOA.Talked with son Rashawn Ordonez. Rashawn reports that he does not have DPOA on the patient that it had run out. Rashawn reports that he was working on guardianship in [facility] but when he moved to [facility] he has to start over.  Brandie ferreira.NP Hope Loza notified of Code status

## 2024-11-13 NOTE — CARE COORDINATION
Pt unable to meaningfully participate in assessment process due to the severity of his cognitive deficits. He was cooperative, friendly, and attempted to engage in conversation. Assessment was completed via direct interaction, observation, and chart review.           [id number]   Psychiatric History   Psychiatric history treatment Psychiatric admissions  (1 previous admit here at [facility] in 2024)   Contact information N/A   Are there any medication issues? Yes  (intermittent compliance)   Recent Psychological Experiences Other(comment)  (cognitive decline, change in medication, change in placement)   Support System   Support system Primary support persons   Types of Support System Other (Comment)  (adult son)   Problems in support system None   Current Living Situation   Home Living Adequate  (LTC facility)   Living information Lives with others   Problems with living situation  No   Lack of basic needs No   SSDI/SSI yes   Other government assistance None   Problems with environment aggression at care center   Current abuse issues none observed   Supervised setting Extended care facility   Relationship problems No   Contact information N/A   Medical and Self-Care Issues   Relevant medical problems None acute   Relevant self-care issues needs assistance with all ADL/IADLs   Barriers to treatment No   Family Constellation   Spouse/partner-name/age unknown   Children-names/ages Son Nathan is primary support person   Parents . 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   Siblings several siblings, only contact with 1 sister   Contact information N/A   Support services   (N/A)   Comment N/A   Childhood   Raised by Biological mother;Biological father   Biological mother    Biological father    Relevant family history brother & sister have dementia   History of abuse No   Comment N/A   Legal History   Legal history No   Other relevant legal issues N/A   Comment N/A   Juvenile legal  history No   Abuse Assessment   Physical abuse Denies   Verbal abuse Denies   Emotional abuse Denies   Financial abuse Denies   Sexual abuse Denies   Possible abuse reported to None needed   Substance Use   Use of substances  No   Motivation for SA Treatment   Stage of engagement   (N/A)   Motivation for treatment   (N/A)   Current barriers to treatment   (N/A)   Comment N/A   Education   Education   (no formal education, can sign his name but unable to write much else, unable to read)   Special education   (N/A)   Work History   Currently employed No   Recent job loss or change   (N/A)    service   (N/A)   /VA involvement N/A   Cultural and Spiritual   Spiritual concerns No   Cultural concerns No   Comment N/A

## 2024-11-13 NOTE — PROGRESS NOTES
Patient appears physically well, alert & oriented to self only, appears confused, with flight of ideas &pressured speech. Difficult to engage in a meaningful conversation. Needs redirection but cooperative so far. No episodes of violence so far. HS meds given whole without any issues. Laid down on his bed after encouragement & supervision. Safe environment provided & maintained.

## 2024-11-13 NOTE — PLAN OF CARE
Problem: Pain  Goal: Verbalizes/displays adequate comfort level or baseline comfort level  11/12/2024 2128 by Anneliese Belle, RN  Outcome: Progressing     Problem: Behavior  Goal: Pt/Family maintain appropriate behavior and adhere to behavioral management agreement, if implemented  Description: INTERVENTIONS:  1. Assess patient/family's coping skills and  non-compliant behavior (including use of illegal substances)  2. Notify security of behavior or suspected illegal substances which indicate the need for search of the family and/or belongings  3. Encourage verbalization of thoughts and concerns in a socially appropriate manner  4. Utilize positive, consistent limit setting strategies supporting safety of patient, staff and others  5. Encourage participation in the decision making process about the behavioral management agreement  6. If a visitor's behavior poses a threat to safety call refer to organization policy.  7. Initiate consult with , Psychosocial CNS, Spiritual Care as appropriate  Outcome: Progressing     Problem: Safety - Adult  Goal: Free from fall injury  11/12/2024 2128 by Anneliese Belle, RN  Outcome: Progressing     Problem: Self Care Deficit  Goal: Return ADL status to a safe level of function  Description: INTERVENTIONS:  1. Administer medication as ordered  2. Assess ADL deficits and provide assistive devices as needed  3. Obtain PT/OT consults as needed  4. Assist and instruct patient to increase activity and self care as tolerated  Outcome: Progressing     Problem: Confusion  Goal: Confusion, delirium, dementia, or psychosis is improved or at baseline  Description: INTERVENTIONS:  1. Assess for possible contributors to thought disturbance, including medications, impaired vision or hearing, underlying metabolic abnormalities, dehydration, psychiatric diagnoses, and notify attending LIP  2. [place] high risk fall precautions, as indicated  3.

## 2024-11-13 NOTE — H&P
Psychiatry History and Physical     Admission Date:    11/12/2024    Identification: domiciled and disabled 73yo with Alzheimer's dementia who was brought to our ED from [facility] due to concern for UTI. He was assess and, while being loaded onto a stretcher to go back to [facility], became aggressive.     SOI: patient. Focused record review.     CC: patient unable to provide chief complaint.  Agitation.     HPI:   Patent was discharge from this program in March after being admitted under similar circumstance.      At some pont he was taken off Trilafon (4mg QHS) and Risperidone 0.5mg BID was ordered but he hadn't started taking it before his admission here.     He presents oriented to self only.    He can't track conversation but responds somewhat to social cues.    He has expressive aphasia.     Pseudobulbar affect?    Past Psychiatric History:    Previous Diagnoses: Alzheimer's dementia per chart.  PreviousHosp: here - 3/2024  [facility]     Med Trials: discharge from here in March on Trilafon  Suicidality: none known   History of violence:  none known    Past Medical History:  Past Medical History:   Diagnosis Date    AD (Alzheimer's disease) ([facility])     Chronic pancreatitis (HCC)     Depressive disorder     Diabetes mellitus (HCC)     Endocarditis     Gastroesophageal reflux     Generalized anxiety disorder     Hypertension     Mitral valve insufficiency        Home Medications:  Prior to Admission medications    Medication Sig Start Date End Date Taking? Authorizing Provider   cephALEXin (KEFLEX) 500 MG capsule Take 1 capsule by mouth 3 times daily for 7 days 11/12/24 11/19/24 Yes Ricardo Collier MD   ALPRAZolam (XANAX) 0.25 MG tablet Take 1 tablet by mouth daily. For anxiety   Yes Provider, MD Faustino   lidocaine (LIDODERM) 5 % Place 1 patch onto the skin daily Apply to lower back; 12 hours on, 12 hours off.   Yes ProviderFaustino MD   mirtazapine (REMERON)  SI, no HI, no delusions voiced, no obsessions,  Perceptions: no AVH, not RTIS  Attention: impaired  Abstraction: impaired  Cognition: impaired  Insight: impaired  Judgment: impaired    LAB: Reviewed all labs obtained during admission to date.      Formulation: domiciled and disabled 75yo with Alzheimer's dementia who was brought to our ED from [facility] due to concern for UTI. He was assess and, while being loaded onto a stretcher to go back to [facility], became aggressive.     Dx:   Primary Psychiatric (DSM V) Diagnosis: dementia with behavioral disturbance  Secondary Psychiatric (DSM V) Diagnoses: none  Chemical Dependency Diagnoses: none known    Plan:  1.  Admit to senior psychiatry for further evaluation.  2. On admission start Risperidone 0.5mg BID as his outpatient provide had planned and add melatonin Qevening. Continue remaining outpatient regimen as prescribed. Order q15min checks for safety, programming, and prn medication for anxiety, agitation, and insomnia.   3. Hospitalist consult for admission.   4. Collateral collected.  5. ELOS 2-3 days. Admitted on a Statement of Belief and placed on a hold to facilitate this evaluation.     Spent > 75 minutes face to face with patient of which >50% was spent counseling and providing education regarding diagnosis, treatment options, and prognosis.    Matty Lozano MD  Staff Psychiatrist

## 2024-11-13 NOTE — H&P
Hospital Medicine History & Physical      PCP: No primary care provider on file.    Date of Admission: 11/12/2024    Date of Service: Pt seen/examined on 11/13/24      Chief Complaint:    Chief Complaint   Patient presents with    Altered Mental Status     Pt brought in by squad with alleged UTI, pt disoriented X4         History Of Present Illness:      The patient is a 74 y.o. male with PMH of Alzheimer's disease, chronic pancreatitis, depression, endocarditis, GERD, anxiety, HTN, Mitral valve insufficiency  who [place] [facility] [place].  Patient was seen and evaluated in the ED by the ED medical provider, patient was medically cleared for admission to senior behavioral health center [facility]  This note serves as an admission medical H&P.    Tobacco use: denies  ETOH use: denies  Illicit drug use: denies    Patient denies any medical complaints     Past Medical History:        Diagnosis Date    AD (Alzheimer's disease) (HCC)     Chronic pancreatitis (HCC)     Depressive disorder     Diabetes mellitus (HCC)     Endocarditis     Gastroesophageal reflux     Generalized anxiety disorder     Hypertension     Mitral valve insufficiency        Past Surgical History:    History reviewed. No pertinent surgical history.    Medications Prior to Admission:    Prior to Admission medications    Medication Sig Start Date End Date Taking? Authorizing Provider   cephALEXin (KEFLEX) 500 MG capsule Take 1 capsule by mouth 3 times daily for 7 days 11/12/24 11/19/24 Yes Ricardo Collier MD   ALPRAZolam (XANAX) 0.25 MG tablet Take 1 tablet by mouth daily. For anxiety   Yes ProviderFaustino MD   lidocaine (LIDODERM) 5 % Place 1 patch onto the skin daily Apply to lower back; 12 hours on, 12 hours off.   Yes Faustino Higgins MD   mirtazapine (REMERON) 7.5 MG tablet Take 1 tablet by mouth nightly For appetite stimulant   Yes Faustino Higgins MD   omeprazole (PRILOSEC) 20 MG delayed release capsule Take 1 capsule by mouth daily  by SARA NDIAYE MD (1986) on 11/12/2024 12:50:48 PM          RADIOLOGY  CT HEAD WO CONTRAST   Final Result   Motion limited.  No acute intracranial abnormality.      Moderate to severe periventricular and frontal parietal white matter disease,   likely due to small-vessel ischemic change.            Echo 2/7/2024    Summary:   Left ventricular wall motion is normal.   Overall left ventricular ejection fraction is estimated to be 60-65%.   Left atrium is dilated.   Prolapse of the posterior mitral leaflet(s).   Moderate mitral regurgitation.   Mitral regurgitant jet is anteriorly directed, which is consistent with   posterior leaflet pathology.   Mild tricuspid regurgitation is present.   No obvious valvular vegetations identified on surface study. If clinical   suspicion is high for valvular endocarditis, would consider MILADY if clinically   appropriate.       ASSESSMENT/PLAN:    Alzheimer's dementia with behavioral disturbances  Anxiety   Depression  - Sent to ER from SNF for aggressive behavior  - SNF evicted patient  - per psychiatry team    Concern for UTI  - unable to get urinalysis in ED  - order placed ok to straight cath if unable to obtain urine  - pt denies any dysuria, frequency   - was seen in the ED last month for possible UTI- ruled out   - no hx of UTI  - was given Keflex in the ED, hold off at this time until urinalysis is collected  - discussed with nursing about obtaining urine      Abnormal EKG  Denies chest pain   - EKG as above  - repeat ordered  - pt denies any chest pain, shortness of breath or palpitations  - will check troponin- normal  - repeat EKG per my reviewed improved from previous        Hx of Bacterial endocarditis  Moderate to severe mitral regurgitation  - felt 2/2 his underlying dementia pt not a good candidate for valve replacement        Diabetes mellitus  -metformin  -monitor BG      GERD  -Pepcid      Pt has no medical complaints at this time. They were informed that should a

## 2024-11-13 NOTE — BH NOTE
[facility]  Treatment Team Note  Review Date & Time: 11/13/2024  9:15     Patient was not in treatment team      Status EXAM:   Mental Status and Behavioral Exam  Normal: No  Level of Assistance: Set up  Facial Expression: Brightened, Exaggerated  Affect: Constricted  Level of Consciousness: Confused  Frequency of Checks: 4 times per hour, close  Mood:Normal: No  Mood: Anxious  Motor Activity:Normal: Yes  Eye Contact: Good  Observed Behavior: Cooperative, Impulsive, Friendly  Sexual Misconduct History: Current - no  Preception: [place] to person  Attention:Normal: No  Attention: Distractible, Unable to concentrate  Thought Processes: Flight of ideas, Tangential  Thought Content:Normal: No  Thought Content: Poverty of content  Depression Symptoms: Impaired concentration  Anxiety Symptoms: Generalized  [place] Symptoms: Flight of ideas, Poor judgment, Pressured speech  Hallucinations: Unable to assess  Delusions:  (TRAVON)  Memory:Normal: No  Memory: Poor recent, Poor remote  Insight and Judgment: No  Insight and Judgment: Poor judgment, Poor insight      Suicide Risk CSSR-S:  1) Within the past month, have you wished you were dead or wished you could go to sleep and not wake up? :  (TRAVON)  2) Have you actually had any thoughts of killing yourself? :  (TRAVON)  6) Have you ever done anything, started to do anything, or prepared to do anything to end your life?:  (TRAVON)      PLAN/TREATMENT RECOMMENDATIONS UPDATE: Patient will take medication as prescribed, eat 75% of meals, attend groups, participate in milieu activities, participate in treatment team and care planning for discharge and follow up.           Aida Omer RN

## 2024-11-14 LAB
BILIRUB UR QL STRIP.AUTO: NEGATIVE
CLARITY UR: CLEAR
COLOR UR: YELLOW
GLUCOSE BLD-MCNC: 119 MG/DL (ref 70–99)
GLUCOSE UR STRIP.AUTO-MCNC: NEGATIVE MG/DL
HGB UR QL STRIP.AUTO: NEGATIVE
KETONES UR STRIP.AUTO-MCNC: NEGATIVE MG/DL
LEUKOCYTE ESTERASE UR QL STRIP.AUTO: NEGATIVE
NITRITE UR QL STRIP.AUTO: NEGATIVE
PATH INTERP BLD-IMP: NORMAL
PERFORMED ON: ABNORMAL
PH UR STRIP.AUTO: 6 [PH] (ref 5–8)
PROT UR STRIP.AUTO-MCNC: NEGATIVE MG/DL
SP GR UR STRIP.AUTO: <=1.005 (ref 1–1.03)
UA COMPLETE W REFLEX CULTURE PNL UR: NORMAL
UA DIPSTICK W REFLEX MICRO PNL UR: NORMAL
URN SPEC COLLECT METH UR: NORMAL
UROBILINOGEN UR STRIP-ACNC: 0.2 E.U./DL

## 2024-11-14 PROCEDURE — 1240000000 HC EMOTIONAL WELLNESS R&B

## 2024-11-14 PROCEDURE — 6370000000 HC RX 637 (ALT 250 FOR IP): Performed by: NURSE PRACTITIONER

## 2024-11-14 PROCEDURE — 81003 URINALYSIS AUTO W/O SCOPE: CPT

## 2024-11-14 PROCEDURE — 6370000000 HC RX 637 (ALT 250 FOR IP): Performed by: PSYCHIATRY & NEUROLOGY

## 2024-11-14 PROCEDURE — 99233 SBSQ HOSP IP/OBS HIGH 50: CPT | Performed by: PSYCHIATRY & NEUROLOGY

## 2024-11-14 PROCEDURE — 80307 DRUG TEST PRSMV CHEM ANLYZR: CPT

## 2024-11-14 RX ADMIN — METFORMIN HYDROCHLORIDE 500 MG: 500 TABLET ORAL at 17:29

## 2024-11-14 RX ADMIN — RISPERIDONE 0.5 MG: 0.25 TABLET, FILM COATED ORAL at 12:06

## 2024-11-14 RX ADMIN — LANSOPRAZOLE 30 MG: 30 TABLET, ORALLY DISINTEGRATING ORAL at 11:07

## 2024-11-14 RX ADMIN — Medication 5 MG: at 17:30

## 2024-11-14 RX ADMIN — MEMANTINE HYDROCHLORIDE 10 MG: 5 TABLET ORAL at 11:12

## 2024-11-14 RX ADMIN — ALPRAZOLAM 0.25 MG: 0.25 TABLET ORAL at 11:14

## 2024-11-14 RX ADMIN — Medication 1 CAPSULE: at 14:41

## 2024-11-14 RX ADMIN — MIRTAZAPINE 15 MG: 15 TABLET, FILM COATED ORAL at 20:38

## 2024-11-14 RX ADMIN — Medication 1 CAPSULE: at 11:12

## 2024-11-14 RX ADMIN — METFORMIN HYDROCHLORIDE 500 MG: 500 TABLET ORAL at 11:14

## 2024-11-14 RX ADMIN — RISPERIDONE 0.5 MG: 0.25 TABLET, FILM COATED ORAL at 20:38

## 2024-11-14 NOTE — PLAN OF CARE
Problem: Behavior  Goal: Pt/Family maintain appropriate behavior and adhere to behavioral management agreement, if implemented  Description: INTERVENTIONS:  1. Assess patient/family's coping skills and  non-compliant behavior (including use of illegal substances)  2. Notify security of behavior or suspected illegal substances which indicate the need for search of the family and/or belongings  3. Encourage verbalization of thoughts and concerns in a socially appropriate manner  4. Utilize positive, consistent limit setting strategies supporting safety of patient, staff and others  5. Encourage participation in the decision making process about the behavioral management agreement  6. If a visitor's behavior poses a threat to safety call refer to organization policy.  7. Initiate consult with , Psychosocial CNS, Spiritual Care as appropriate  Outcome: Progressing   Adonis slept until about 1030 this morning.  He did not eat breakfast but did eat lunch.  After getting up, Adonis has stayed up in the day room all day..  He attended the groups, watched TV and the feature movie. Adonis was compliant with his medication taking his pills whole with water.    Problem: Confusion  Goal: Confusion, delirium, dementia, or psychosis is improved or at baseline  Description: INTERVENTIONS:  1. Assess for possible contributors to thought disturbance, including medications, impaired vision or hearing, underlying metabolic abnormalities, dehydration, psychiatric diagnoses, and notify attending LIP  2. [place] high risk fall precautions, as indicated  3. Provide frequent short contacts to provide reality reorientation, refocusing and direction  4. Decrease environmental stimuli, including noise as appropriate  5. Monitor and intervene to maintain adequate nutrition, hydration, elimination, sleep and activity  6. If unable to ensure safety without constant attention obtain sitter and review sitter guidelines with

## 2024-11-14 NOTE — GROUP NOTE
Group Therapy Note    Date: 11/14/2024    Group Start Time: 1330  Group End Time: 1500  Group Topic: Recreational    [facility]    Abigail Collier        Group Therapy Note    Attendees: 3    For afternoon group, participants decided on a movie to watch together.  Group watched \"Home Alone 2\" in the group room and were able to discuss their thoughts.  Target outcomes of activity included stimulating memories/nostalgia, improving social connection, reducing stress/anxiety, increasing relaxation, and improving mood.      Notes:  Adonis was present and engaged during session.  He participated in watching the movie and interacting with peers.  Met goal for group.    Status After Intervention:  Improved    Participation Level: Active Listener and Interactive    Participation Quality: Appropriate, Attentive, Sharing, and Supportive      Speech:  normal      Thought Process/Content: Logical      Affective Functioning: Congruent      Mood: euthymic      Level of consciousness:  Alert and Attentive      Response to Learning: Able to verbalize current knowledge/experience and Progressing to goal      Endings: None Reported    Modes of Intervention: Support, Socialization, Exploration, Activity, and Media      Discipline Responsible: Psychoeducational Specialist and Recreational Therapist      Signature:  Abigail Collier MA, CTRS

## 2024-11-14 NOTE — PLAN OF CARE
Pt was visible in the dayroom watching TV. Pt was alert and oriented to self only. Confused but can be re-directed. Follows command but needs follow-up. Pt was cooperative to care. Denies avh but observed with delusional thoughts (paranoia). Pt states that there are people trying to steal his money. Pt states that his going to give me a lot of money once he go home. Re-directed done. Oriented him to present situation. Pt agreed to do blood works as order. Vitals signs checked and recorded. Took snacks and consumed. Seen at times. Pt seen socializing well with others. Took medication but needs encouragement. Needs attended.   Problem: Behavior  Goal: Pt/Family maintain appropriate behavior and adhere to behavioral management agreement, if implemented  Outcome: Progressing  Flowsheets (Taken 11/13/2024 2230)  Patient/family maintains appropriate behavior and adheres to behavioral management agreement, if implemented:   Assess patient/family’s coping skills and  non-compliant behavior (including use of illegal substances)   Notify security of behavior or suspected illegal substances which indicate the need for search of the patient and/or belongings   Encourage verbalization of thoughts and concerns in a socially appropriate manner   Utilize positive, consistent limit setting strategies supporting safety of patient, staff and others   Encourage participation in the decision making process about the behavioral management agreement   Implement a Health Care Agreement if patient meets criteria   If a patient’s behavior jeopardizes the safety of the patient, staff, or others refer to organization policy. If a visitor’s behavior poses a threat to safety refer to organization policy   Initiate consult with , Psychosocial Clinical Nurse Specialist, Spiritual Care as appropriate     Problem: Confusion  Goal: Confusion, delirium, dementia, or psychosis is improved or at baseline  Outcome: Not

## 2024-11-14 NOTE — BH NOTE
Spoke Rio with [facility] ([phone]) and obtained collateral regarding the events leading up to pt's hospitalization.     Pt was at [facility] until 10/29/2024. He was transitioned to [facility] to be closer to his family. Adonis was on the perphenazine until July with no behaviors/problems. The psychiatrist weaned him off the med, and he continued to do really well. Pt was then moved to [facility] and began to have difficulties. Rio believes the transition was the precipitating event and was just too much for pt to adjust to without medications. They plan to take pt back at [facility] instead of [facility] when ready for discharge from the hospital. The hope is the difficulties will resolve once he is back in a familiar environment. Pt will need a prior-auth and new PASRR prior to his return since it is a new facility. They will start the prior-auth today.

## 2024-11-15 VITALS
TEMPERATURE: 98.2 F | DIASTOLIC BLOOD PRESSURE: 80 MMHG | RESPIRATION RATE: 18 BRPM | OXYGEN SATURATION: 98 % | HEART RATE: 82 BPM | WEIGHT: 131.8 LBS | SYSTOLIC BLOOD PRESSURE: 125 MMHG | BODY MASS INDEX: 21.96 KG/M2 | HEIGHT: 65 IN

## 2024-11-15 PROBLEM — K21.9 GERD (GASTROESOPHAGEAL REFLUX DISEASE): Status: ACTIVE | Noted: 2024-11-15

## 2024-11-15 LAB
AMPHETAMINES UR QL SCN>1000 NG/ML: NORMAL
BARBITURATES UR QL SCN>200 NG/ML: NORMAL
BENZODIAZ UR QL SCN>200 NG/ML: NORMAL
CANNABINOIDS UR QL SCN>50 NG/ML: NORMAL
COCAINE UR QL SCN: NORMAL
DRUG SCREEN COMMENT UR-IMP: NORMAL
FENTANYL SCREEN, URINE: NORMAL
GLUCOSE BLD-MCNC: 152 MG/DL (ref 70–99)
METHADONE UR QL SCN>300 NG/ML: NORMAL
OPIATES UR QL SCN>300 NG/ML: NORMAL
OXYCODONE UR QL SCN: NORMAL
PCP UR QL SCN>25 NG/ML: NORMAL
PERFORMED ON: ABNORMAL
PH UR STRIP: 6 [PH]

## 2024-11-15 PROCEDURE — 6370000000 HC RX 637 (ALT 250 FOR IP): Performed by: PSYCHIATRY & NEUROLOGY

## 2024-11-15 PROCEDURE — 5130000000 HC BRIDGE APPOINTMENT

## 2024-11-15 PROCEDURE — 6370000000 HC RX 637 (ALT 250 FOR IP): Performed by: NURSE PRACTITIONER

## 2024-11-15 PROCEDURE — 99239 HOSP IP/OBS DSCHRG MGMT >30: CPT | Performed by: PSYCHIATRY & NEUROLOGY

## 2024-11-15 RX ORDER — MECOBALAMIN 5000 MCG
5 TABLET,DISINTEGRATING ORAL EVERY EVENING
Qty: 30 TABLET | Refills: 0
Start: 2024-11-15 | End: 2024-12-15

## 2024-11-15 RX ADMIN — RISPERIDONE 0.5 MG: 0.25 TABLET, FILM COATED ORAL at 11:52

## 2024-11-15 RX ADMIN — MEMANTINE HYDROCHLORIDE 10 MG: 5 TABLET ORAL at 11:51

## 2024-11-15 RX ADMIN — Medication 1 CAPSULE: at 11:52

## 2024-11-15 RX ADMIN — ALPRAZOLAM 0.25 MG: 0.25 TABLET ORAL at 11:52

## 2024-11-15 RX ADMIN — OLANZAPINE 2.5 MG: 5 TABLET, FILM COATED ORAL at 00:10

## 2024-11-15 RX ADMIN — METFORMIN HYDROCHLORIDE 500 MG: 500 TABLET ORAL at 11:51

## 2024-11-15 RX ADMIN — LANSOPRAZOLE 30 MG: 30 TABLET, ORALLY DISINTEGRATING ORAL at 06:45

## 2024-11-15 ASSESSMENT — SLEEP AND FATIGUE QUESTIONNAIRES
DO YOU HAVE DIFFICULTY SLEEPING: COMMENT
DO YOU USE A SLEEP AID: COMMENT
AVERAGE NUMBER OF SLEEP HOURS: 1.5

## 2024-11-15 NOTE — BH NOTE
states already received influenza vaccine elsewhere  ( ) Patient received influenza vaccine during this hospitalization  ( ) Patient refused influenza vaccine at this time  ( ) Not offered      [place] RN

## 2024-11-15 NOTE — BH NOTE
Report made to Emily padgett [facility].  All questions answered.

## 2024-11-15 NOTE — DISCHARGE INSTR - COC
Continuity of Care Form    Patient Name: Adonis Ordonez   :  1950  MRN:  [id number]    Admit date:  2024  Discharge date:  11/15/2024    Code Status Order: Full Code   Advance Directives:   Advance Care Flowsheet Documentation             Admitting Physician:  Tai Arnett MD  PCP: No primary care provider on file.    Discharging Nurse: REYNA Miller  Discharging Hospital Unit/Room#: 2207/2207-01  Discharging Unit Phone Number: [phone]    Emergency Contact:   Extended Emergency Contact Information  Primary Emergency Contact: Rashawn Ordonez  Home Phone: [phone]  Relation: Child   needed? No  Secondary Emergency Contact: Carmita Rios  Home Phone: [phone]  Relation: Niece/Nephew   needed? No    Past Surgical History:  History reviewed. No pertinent surgical history.    Immunization History:     There is no immunization history on file for this patient.    Active Problems:  Patient Active Problem List   Diagnosis Code    Dementia, unspecified dementia severity, unspecified dementia type, unspecified whether behavioral, psychotic, or mood disturbance or anxiety ([facility]) F03.90    Dementia with behavioral disturbance ([facility]) F03.918    Severe vascular dementia with agitation ([facility]) F01.C11    Endocarditis I38    Mitral valve insufficiency I34.0    Type 2 diabetes mellitus without complication, with long-term current use of insulin ([facility]) E11.9, Z79.4    GERD (gastroesophageal reflux disease) K21.9       Isolation/Infection:   Isolation            No Isolation          Patient Infection Status       None to display                     Nurse Assessment:  Last Vital Signs: /80   Pulse 82   Temp 98.2 °F (36.8 °C) (Oral)   Resp 18   Ht 1.651 m (5' 5\")   Wt 59.8 kg (131 lb 12.8 oz)   SpO2 98%   BMI 21.93 kg/m²     Last documented pain score (0-10 scale): Pain Level: 3  Last Weight:   Wt Readings from Last 1 Encounters:   24 59.8 kg (131 lb 12.8 oz)     Mental Status:  signed by VIRAL Rodriguez on 11/15/24 at 10:21 AM EST    PHYSICIAN SECTION    Prognosis: Poor    Condition at Discharge: Stable    Rehab Potential (if transferring to Rehab): N/A    Recommended Labs or Other Treatments After Discharge: Defer to Facility    Physician Certification: I certify the above information and transfer of Adonis Ordonez  is necessary for the continuing treatment of the diagnosis listed and that he requires Intermediate Nursing Care for greater 30 days.     Update Admission H&P: Changes in H&P as follows - See discharge summary    PHYSICIAN SIGNATURE:  Electronically signed by THUY HENDERSON MD on 11/15/24 at 10:25 AM EST

## 2024-11-15 NOTE — BH NOTE
Spoke with Rio in admission with [facility] and coordinated pt's discharge to them this afternoon with transport ETA of 13:00.        9:50 - Spoke with pt's son Nathan and updated him on pt's progress and plan to DC to [facility]. Nathan stated they do not want pt to return to [facility]. They are unhappy with the placement and would like to have him moved to a new facility. Explained pt does not meet criteria for hospitalization as he is behaviorally and medically stable. Also explained we could not keep pt in the hospital just to locate a new placement. This is something the family can do with the assistance of the [facility] staff while pt is in that facility.         10:02 - Spoke with Rio at [facility] and updated him on family's dislike of plan to discharge to [facility] today. He will reach out to the family and discuss the placement and call us back.       10:24 - Incoming call from Rio who stated he spoke to the family and addressed their concerns. Nathan is now in agreement with pt's return to [facility] today.
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
## 2024-11-15 NOTE — PLAN OF CARE
Problem: Confusion  Goal: Confusion, delirium, dementia, or psychosis is improved or at baseline  Description: INTERVENTIONS:  1. Assess for possible contributors to thought disturbance, including medications, impaired vision or hearing, underlying metabolic abnormalities, dehydration, psychiatric diagnoses, and notify attending LIP  2. [place] high risk fall precautions, as indicated  3. Provide frequent short contacts to provide reality reorientation, refocusing and direction  4. Decrease environmental stimuli, including noise as appropriate  5. Monitor and intervene to maintain adequate nutrition, hydration, elimination, sleep and activity  6. If unable to ensure safety without constant attention obtain sitter and review sitter guidelines with assigned personnel  7. Initiate Psychosocial CNS and Spiritual Care consult, as indicated  Outcome: Progressing     Problem: Behavior  Goal: Pt/Family maintain appropriate behavior and adhere to behavioral management agreement, if implemented  Description: INTERVENTIONS:  1. Assess patient/family's coping skills and  non-compliant behavior (including use of illegal substances)  2. Notify security of behavior or suspected illegal substances which indicate the need for search of the family and/or belongings  3. Encourage verbalization of thoughts and concerns in a socially appropriate manner  4. Utilize positive, consistent limit setting strategies supporting safety of patient, staff and others  5. Encourage participation in the decision making process about the behavioral management agreement  6. If a visitor's behavior poses a threat to safety call refer to organization policy.  7. Initiate consult with , Psychosocial CNS, Spiritual Care as appropriate  Outcome: Progressing  Adonis has been visible in the milieu for much of the morning.  He was up for breakfast and ate well.  Adonis went back to bed before taking his morning medication but took his pills
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
## 2024-11-15 NOTE — PLAN OF CARE
Assumed care @1930. Pt is confused and disorganized. Calm and cooperative with care. Pt is A/O to person only and unable to appropriately answer BH Assessment questions at this time. Pt has remained free from falls and behaviors of self-harm. Plan of care is ongoing. Staff will continue to monitor q15 mins per unit protocol.     Problem: Confusion  Goal: Confusion, delirium, dementia, or psychosis is improved or at baseline  Description: INTERVENTIONS:  1. Assess for possible contributors to thought disturbance, including medications, impaired vision or hearing, underlying metabolic abnormalities, dehydration, psychiatric diagnoses, and notify attending LIP  2. [place] high risk fall precautions, as indicated  3. Provide frequent short contacts to provide reality reorientation, refocusing and direction  4. Decrease environmental stimuli, including noise as appropriate  5. Monitor and intervene to maintain adequate nutrition, hydration, elimination, sleep and activity  6. If unable to ensure safety without constant attention obtain sitter and review sitter guidelines with assigned personnel  7. Initiate Psychosocial CNS and Spiritual Care consult, as indicated  11/14/2024 2052 by Robert Ellison, RN  Outcome: Progressing  11/14/2024 1823 by New Taylor RN  Outcome: Progressing     Problem: Behavior  Goal: Pt/Family maintain appropriate behavior and adhere to behavioral management agreement, if implemented  Description: INTERVENTIONS:  1. Assess patient/family's coping skills and  non-compliant behavior (including use of illegal substances)  2. Notify security of behavior or suspected illegal substances which indicate the need for search of the family and/or belongings  3. Encourage verbalization of thoughts and concerns in a socially appropriate manner  4. Utilize positive, consistent limit setting strategies supporting safety of patient, staff and others  5. Encourage participation in the decision making

## 2024-11-18 ENCOUNTER — FOLLOWUP TELEPHONE ENCOUNTER (OUTPATIENT)
Dept: PSYCHIATRY | Age: 74
End: 2024-11-18

## 2024-11-18 NOTE — DISCHARGE SUMMARY
healthcare needs. Address: [address] [place] [address] [address], [place] [address]     Phone: [phone]              More than 30 minutes were spent with the patient in completing this  evaluation and more than 50% of the time was spent completing this  evaluation, providing counseling, and planning treatment with the  patient.
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
